# Patient Record
Sex: FEMALE | Race: WHITE | NOT HISPANIC OR LATINO | Employment: UNEMPLOYED | URBAN - METROPOLITAN AREA
[De-identification: names, ages, dates, MRNs, and addresses within clinical notes are randomized per-mention and may not be internally consistent; named-entity substitution may affect disease eponyms.]

---
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
## 2018-01-04 ENCOUNTER — TELEPHONE (OUTPATIENT)
Dept: ORTHOPEDICS | Facility: CLINIC | Age: 18
End: 2018-01-04
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
## 2018-01-10 ENCOUNTER — OFFICE VISIT (OUTPATIENT)
Dept: ORTHOPEDICS | Facility: CLINIC | Age: 18
End: 2018-01-10
Payer: MEDICAID

## 2018-01-10 VITALS — BODY MASS INDEX: 28.25 KG/M2 | WEIGHT: 180 LBS | HEIGHT: 67 IN

## 2018-01-10 DIAGNOSIS — M54.50 ACUTE LEFT-SIDED LOW BACK PAIN WITHOUT SCIATICA: Primary | ICD-10-CM

## 2018-01-10 PROCEDURE — 99213 OFFICE O/P EST LOW 20 MIN: CPT | Mod: PBBFAC,PN | Performed by: ORTHOPAEDIC SURGERY

## 2018-01-10 PROCEDURE — 99999 PR PBB SHADOW E&M-EST. PATIENT-LVL III: CPT | Mod: PBBFAC,,, | Performed by: ORTHOPAEDIC SURGERY

## 2018-01-10 PROCEDURE — 99214 OFFICE O/P EST MOD 30 MIN: CPT | Mod: S$PBB,,, | Performed by: ORTHOPAEDIC SURGERY

## 2018-01-10 RX ORDER — NAPROXEN 500 MG/1
500 TABLET ORAL 2 TIMES DAILY WITH MEALS
Qty: 60 TABLET | Refills: 2 | Status: SHIPPED | OUTPATIENT
Start: 2018-01-10 | End: 2018-10-25
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
## 2018-01-19 ENCOUNTER — TELEPHONE (OUTPATIENT)
Dept: ORTHOPEDICS | Facility: CLINIC | Age: 18
End: 2018-01-19

## 2018-01-19 NOTE — TELEPHONE ENCOUNTER
Spoke with patient after verification of name and  provided patient with an appointment date and time that worked for her schedule 18 @ 9AM. Patient voiced understanding.

## 2018-02-02 ENCOUNTER — PATIENT MESSAGE (OUTPATIENT)
Dept: ORTHOPEDICS | Facility: CLINIC | Age: 18
End: 2018-02-02

## 2018-02-02 DIAGNOSIS — M54.42 ACUTE LEFT-SIDED LOW BACK PAIN WITH LEFT-SIDED SCIATICA: Primary | ICD-10-CM

## 2018-04-24 DIAGNOSIS — G89.29 CHRONIC PAIN: ICD-10-CM

## 2018-04-24 DIAGNOSIS — M54.50 LOW BACK PAIN: Primary | ICD-10-CM

## 2018-05-14 ENCOUNTER — CLINICAL SUPPORT (OUTPATIENT)
Dept: REHABILITATION | Facility: HOSPITAL | Age: 18
End: 2018-05-14
Payer: MEDICAID

## 2018-05-14 DIAGNOSIS — M54.5 BILATERAL LOW BACK PAIN, UNSPECIFIED CHRONICITY, WITH SCIATICA PRESENCE UNSPECIFIED: ICD-10-CM

## 2018-05-14 PROBLEM — M54.50 BILATERAL LOW BACK PAIN: Status: ACTIVE | Noted: 2018-05-14

## 2018-05-14 PROCEDURE — 97161 PT EVAL LOW COMPLEX 20 MIN: CPT | Mod: PO | Performed by: PHYSICAL THERAPIST
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
## 2018-05-14 NOTE — PATIENT INSTRUCTIONS
Pelvic Tilt        Flatten back by tightening stomach muscles and buttocks.  Repeat 10 times per set. Do 1-2 sets per session. Do 1-2 sessions per day.     [email or web address]     Copyright © [employer]. All rights reserved.        Copyright © [employer]. All rights reserved.   Knee-to-Chest Stretch: Unilateral        With hand behind right knee, pull knee in to chest until a comfortable stretch is felt in lower back and buttocks. Keep back relaxed. Hold 30 seconds.  Repeat 3 times per set. Do 1 sets per session. Do 1-2 sessions per day.     [email or web address]     Copyright © [employer]. All rights reserved.     Bridging        Slowly raise buttocks from floor, keeping stomach tight.  Repeat 10 times per set. Do 2 sets per session. Do 1-2 sessions per day.     [email or web address]     Copyright © [employer]. All rights reserved.     Clam Shell 45 Degrees        Lying with hips and knees bent 45°, one pillow between knees and ankles. Lift knee. Be sure pelvis does not roll backward. Do not arch back.  Do 10 times, each leg, 1-2 times per day.     [email or web address]     Copyright © [employer]. All rights reserved.   Heel Slide: 4-10 Inches - Sagittal Plane Stability        Slide heel 4 inches down. Be sure pelvis does not tip forward or backward. Do 10 times. Restabilize pelvis. Repeat with other leg.  Do 1 sets, 1-2 times per day.     [email or web address]     Copyright © [employer]. All rights reserved.

## 2018-06-12 ENCOUNTER — CLINICAL SUPPORT (OUTPATIENT)
Dept: REHABILITATION | Facility: HOSPITAL | Age: 18
End: 2018-06-12
Payer: MEDICAID

## 2018-06-12 DIAGNOSIS — M54.5 BILATERAL LOW BACK PAIN, UNSPECIFIED CHRONICITY, WITH SCIATICA PRESENCE UNSPECIFIED: ICD-10-CM

## 2018-06-12 PROCEDURE — 97110 THERAPEUTIC EXERCISES: CPT | Mod: PO | Performed by: PHYSICAL THERAPIST

## 2018-06-12 NOTE — PROGRESS NOTES
"                            Physical Therapy Daily Treatment Note     Name: Maalchi Weston  Clinic Number: [id number]    Therapy Diagnosis:   Encounter Diagnosis   Name Primary?    Bilateral low back pain, unspecified chronicity, with sciatica presence unspecified      Physician: Floyd, Provider  Visit Date: 6/12/2018    Physician Orders: PT Eval and Treat  Medical Diagnosis: low back pain, chronic  Evaluation Date: 5/14/2018  Authorization Period Expiration: 4/29/2018  Plan of Care Certification Period: 7/9/18  Visit #/ Visits authorized: 2 / 12    Time In: 1500  Time Out: 1600  Total Billable Time: 55 minutes    Precautions: Standard and history of hypotension    Subjective     Pt reports: she was not compliant with home exercise program given last session. Patient reported reason why due to recent events involving depression. Patient reported seeking medical professional as well.    Pain: 5/10  Location: low back    Objective   Vitals:  /89, HR 67 bpm    Guillermo received therapeutic exercises to develop strength, endurance, ROM, flexibility, posture and core stabilization for 55 minutes including:    -SKTC 3/30" each  -TA 3/10 with proper breathing  -S/L CLAM 3/10 with GTB  -prone x 2 minutes to prone: alternating arm/leg 2/10 slowly    Shuttle: DL 3b 3/10 with hip band (green), SL 2b 2/10 alternating      Home Exercises Provided and Patient Education Provided     Education provided:   - progress towards goals   - role of therapy     Written Home Exercises Provided: Patient educated to continue with previously issued HEP.  Exercises were reviewed and Guillermo was able to demonstrate them prior to the end of the session.   Pt received a written copy of exercises to perform at home.     Guillermo demonstrated good  understanding of the education provided.     Assessment     Good tolerance with TE. Tactile input for TA. No increase in s/s.  Fair neuromuscular control.    Guillermo is progressing well towards her goals. "   Pt prognosis is Fair.     Pt will continue to benefit from skilled outpatient physical therapy to address the deficits listed in the problem list box on initial evaluation, provide pt/family education and to maximize pt's level of independence in the home and community environment.     Pt's spiritual, cultural and educational needs considered and pt agreeable to plan of care and goals.    Anticipated barriers to physical therapy:     Goals:   Short Term GOALS:  In 4 weeks, pt. will:  1. Pt will present with increased lumbar extension AROM by 25% for ADL purposes.  2. Pt will present with increased hip MMT by one half grade for increased ease with functional ADLs.  3. Pt will report decreased pain to </= 3/10  4. Pt will demonstrate TA activation while performing dynamic movement for stability purposes.  5. Increase hamstring length 5-8 degrees for mobility purposes.     Long Term GOALS:  In 8 weeks, pt. will:  - be independent with HEP and SX management   -demonstrate hip MMT 4 or > for ADL purposes.  -demonstrate lumbar extension with uniform curvature for postural balance purposes.  -negotiate stairs independently with no painful limitations.  -demonstrate corrective seated posture for school/work related tasks.  Plan     Continue with SHAMA Abdi, PT

## 2018-06-19 ENCOUNTER — CLINICAL SUPPORT (OUTPATIENT)
Dept: REHABILITATION | Facility: HOSPITAL | Age: 18
End: 2018-06-19
Payer: MEDICAID

## 2018-06-19 DIAGNOSIS — M54.5 BILATERAL LOW BACK PAIN, UNSPECIFIED CHRONICITY, WITH SCIATICA PRESENCE UNSPECIFIED: ICD-10-CM

## 2018-06-19 PROCEDURE — 97110 THERAPEUTIC EXERCISES: CPT | Mod: PO | Performed by: PHYSICAL THERAPIST

## 2018-06-19 NOTE — PROGRESS NOTES
"                            Physical Therapy Daily Treatment Note     Name: Malachi Weston  Clinic Number: [id number]    Therapy Diagnosis:   Encounter Diagnosis   Name Primary?    Bilateral low back pain, unspecified chronicity, with sciatica presence unspecified      Physician: Floyd, Provider  Visit Date: 6/19/2018    Physician Orders: PT Eval and Treat  Medical Diagnosis: low back pain, chronic  Evaluation Date: 5/14/2018  Authorization Period Expiration: 4/29/2018  Plan of Care Certification Period: 7/9/18  Visit #/ Visits authorized: 3 / 12    Time In: 1500  Time Out: 1600  Total Billable Time: 55 minutes    Precautions: Standard and history of hypotension    Subjective     Pt reports: she was starting the HEP and feels better with the legs up vs lying down. Localized low back pain. No radicular pain. No numbness/tingling noted.    Pain: 2/10  Location: low back    Objective   Vitals:  /82, HR 69 bpm    Guillermo received therapeutic exercises to develop strength, endurance, ROM, flexibility, posture and core stabilization for 55 minutes including:  -recumbent bike x 10 minutes, low intensity on slant board  -standing gastroc stretch 3/30"   -SKTC 3/30" each  -TA 3/10 with proper breathing  -S/L CLAM 3/10 with GTB  -prone x 2 minutes to prone: alternating arm/leg 2/10 slowly (previous)    -supine: TA (modified dead-bug) 2/10    Shuttle: DL 3b 3/10 with hip band (green), SL 2b 2/10 alternating    Lateral step downs: 2/10 each    Home Exercises Provided and Patient Education Provided     Education provided:   - progress towards goals   - role of therapy     Written Home Exercises Provided: Patient educated to continue with previously issued HEP.  Exercises were reviewed and Guillermo was able to demonstrate them prior to the end of the session.   Pt received a written copy of exercises to perform at home.     Giullermo demonstrated good  understanding of the education provided.     Assessment   Patient received 50% " cueing and tactile input on TA activation. No increase in s/s.  Good tolerance with standing TE/lateral step downs.  Fair neuromuscular control.    Guillermo is progressing well towards her goals.   Pt prognosis is Fair.     Pt will continue to benefit from skilled outpatient physical therapy to address the deficits listed in the problem list box on initial evaluation, provide pt/family education and to maximize pt's level of independence in the home and community environment.     Pt's spiritual, cultural and educational needs considered and pt agreeable to plan of care and goals.    Anticipated barriers to physical therapy:     Goals:   Short Term GOALS:  In 4 weeks, pt. will:  1. Pt will present with increased lumbar extension AROM by 25% for ADL purposes.  2. Pt will present with increased hip MMT by one half grade for increased ease with functional ADLs.  3. Pt will report decreased pain to </= 3/10  4. Pt will demonstrate TA activation while performing dynamic movement for stability purposes.  5. Increase hamstring length 5-8 degrees for mobility purposes.     Long Term GOALS:  In 8 weeks, pt. will:  - be independent with HEP and SX management   -demonstrate hip MMT 4 or > for ADL purposes.  -demonstrate lumbar extension with uniform curvature for postural balance purposes.  -negotiate stairs independently with no painful limitations.  -demonstrate corrective seated posture for school/work related tasks.  Plan     Continue with SHAMA Abdi, PT

## 2018-06-26 ENCOUNTER — CLINICAL SUPPORT (OUTPATIENT)
Dept: REHABILITATION | Facility: HOSPITAL | Age: 18
End: 2018-06-26
Payer: MEDICAID

## 2018-06-26 DIAGNOSIS — M54.5 BILATERAL LOW BACK PAIN, UNSPECIFIED CHRONICITY, WITH SCIATICA PRESENCE UNSPECIFIED: ICD-10-CM

## 2018-06-26 PROCEDURE — 97110 THERAPEUTIC EXERCISES: CPT | Mod: PO | Performed by: PHYSICAL THERAPIST

## 2018-06-26 NOTE — PROGRESS NOTES
"                            Physical Therapy Daily Treatment Note     Name: Malachi Weston  Clinic Number: [id number]    Therapy Diagnosis:   Encounter Diagnosis   Name Primary?    Bilateral low back pain, unspecified chronicity, with sciatica presence unspecified      Physician: Floyd, Provider  Visit Date: 6/26/2018    Physician Orders: PT Eval and Treat  Medical Diagnosis: low back pain, chronic  Evaluation Date: 5/14/2018  Authorization Period Expiration: 4/29/2018  Plan of Care Certification Period: 7/9/18  Visit #/ Visits authorized: 4 / 12    Time In: 1500  Time Out: 1600  Total Billable Time: 55 minutes    Precautions: Standard and history of hypotension    Subjective     Pt reports: she has been having low back pain at the end of the day from prolong standing. No new s/s. No radicular pain.    Pain: 2/10  Location: low back    Objective   Vitals:  /80, HR 70 bpm    Guillermo received therapeutic exercises to develop strength, endurance, ROM, flexibility, posture and core stabilization for 55 minutes including:    -recumbent bike x 10 minutes, low intensity on slant board  -standing gastroc stretch 3/30"   -SKTC 3/30" each  -TA 3/10 with proper breathing  -S/L CLAM 3/10 with GTB  -prone x 2 minutes to prone: alternating arm/leg 2/10 slowly     -supine: TA (modified dead-bug) 2/10    Shuttle: DL 3b 3/10 with hip band (green), SL 2b 3/10 alternating    Lateral step downs: 2/10 each    Home Exercises Provided and Patient Education Provided     Education provided:   - progress towards goals   - role of therapy     Written Home Exercises Provided: Patient educated to continue with previously issued HEP.  Exercises were reviewed and Guillermo was able to demonstrate them prior to the end of the session.   Pt received a written copy of exercises to perform at home.     Guillermo demonstrated good  understanding of the education provided.     Assessment   Patient able to sustain supine pelvic tilt/TA while moving limbs. " No increase in s/s.  Good tolerance with standing TE/lateral step downs.  Fair neuromuscular control continues.    Guillermo is progressing well towards her goals.   Pt prognosis is Fair.     Pt will continue to benefit from skilled outpatient physical therapy to address the deficits listed in the problem list box on initial evaluation, provide pt/family education and to maximize pt's level of independence in the home and community environment.     Pt's spiritual, cultural and educational needs considered and pt agreeable to plan of care and goals.    Anticipated barriers to physical therapy:     Goals:   Short Term GOALS:  In 4 weeks, pt. will:  1. Pt will present with increased lumbar extension AROM by 25% for ADL purposes.  2. Pt will present with increased hip MMT by one half grade for increased ease with functional ADLs.  3. Pt will report decreased pain to </= 3/10  4. Pt will demonstrate TA activation while performing dynamic movement for stability purposes.  5. Increase hamstring length 5-8 degrees for mobility purposes.     Long Term GOALS:  In 8 weeks, pt. will:  - be independent with HEP and SX management   -demonstrate hip MMT 4 or > for ADL purposes.  -demonstrate lumbar extension with uniform curvature for postural balance purposes.  -negotiate stairs independently with no painful limitations.  -demonstrate corrective seated posture for school/work related tasks.  Plan     Continue with SHAMA Abdi, PT

## 2018-07-03 ENCOUNTER — CLINICAL SUPPORT (OUTPATIENT)
Dept: REHABILITATION | Facility: HOSPITAL | Age: 18
End: 2018-07-03
Payer: MEDICAID

## 2018-07-03 DIAGNOSIS — M54.5 BILATERAL LOW BACK PAIN, UNSPECIFIED CHRONICITY, WITH SCIATICA PRESENCE UNSPECIFIED: ICD-10-CM

## 2018-07-03 PROCEDURE — 97110 THERAPEUTIC EXERCISES: CPT | Mod: PO | Performed by: PHYSICAL THERAPIST

## 2018-07-03 NOTE — PROGRESS NOTES
"                            Physical Therapy Daily Treatment Note     Name: Malachi Weston  Clinic Number: [id number]    Therapy Diagnosis:   Encounter Diagnosis   Name Primary?    Bilateral low back pain, unspecified chronicity, with sciatica presence unspecified      Physician: Floyd, Provider  Visit Date: 7/3/2018    Physician Orders: PT Eval and Treat  Medical Diagnosis: low back pain, chronic  Evaluation Date: 5/14/2018  Authorization Period Expiration: 4/29/2018  Plan of Care Certification Period: 7/9/18  Visit #/ Visits authorized: 5/ 12    Time In: 1450  Time Out: 1550  Total Billable Time: 55 minutes    Precautions: Standard and history of hypotension.    Subjective     Pt reports: she has been performing HEP once a day. Started a new job (full time) with prolong standing noted.  Patient noted with prolong standing with 2 full time jobs. Localized. No radicular. Improved mobility    Pain: 2/10  Location: low back    Objective   Vitals:  /80, HR 65 bpm    Guillermo received therapeutic exercises to develop strength, endurance, ROM, flexibility, posture and core stabilization for 55 minutes including:    -recumbent bike x 5 minutes, low intensity on slant board  -standing gastroc stretch 3/30"   -SLR 2/10 each  -SKTC 3/30" each  -TA 3/10 with proper breathing  -S/L CLAM 3/10 with GTB each    -quadruped: x 10 with neutral pelvis (alternating legs)    -supine: TA (modified dead-bug) 2/10  -supine: TA/ball press 2/10    Shuttle: DL 3b 2/10 with hip band (green), SL 2b 3/10 alternating    Lateral step downs: 3/10 each    Home Exercises Provided and Patient Education Provided     Education provided:   - progress towards goals   - role of therapy     Written Home Exercises Provided: Patient educated to continue with previously issued HEP.  Exercises were reviewed and Guillermo was able to demonstrate them prior to the end of the session.   Pt received a written copy of exercises to perform at home.     Guillermo " demonstrated good  understanding of the education provided.     Assessment   Tolerated TE with improvement with TA.  No increase in s/s.    Guillermo is progressing well towards her goals.   Pt prognosis is Fair.     Pt will continue to benefit from skilled outpatient physical therapy to address the deficits listed in the problem list box on initial evaluation, provide pt/family education and to maximize pt's level of independence in the home and community environment.     Pt's spiritual, cultural and educational needs considered and pt agreeable to plan of care and goals.    Anticipated barriers to physical therapy:     Goals:   Short Term GOALS:  In 4 weeks, pt. will:  1. Pt will present with increased lumbar extension AROM by 25% for ADL purposes.  2. Pt will present with increased hip MMT by one half grade for increased ease with functional ADLs.  3. Pt will report decreased pain to </= 3/10  4. Pt will demonstrate TA activation while performing dynamic movement for stability purposes.  5. Increase hamstring length 5-8 degrees for mobility purposes.     Long Term GOALS:  In 8 weeks, pt. will:  - be independent with HEP and SX management   -demonstrate hip MMT 4 or > for ADL purposes.  -demonstrate lumbar extension with uniform curvature for postural balance purposes.  -negotiate stairs independently with no painful limitations.  -demonstrate corrective seated posture for school/work related tasks.  Plan     Continue with SHAMA Abdi, PT

## 2018-07-10 ENCOUNTER — CLINICAL SUPPORT (OUTPATIENT)
Dept: REHABILITATION | Facility: HOSPITAL | Age: 18
End: 2018-07-10
Payer: MEDICAID

## 2018-07-10 DIAGNOSIS — M54.5 BILATERAL LOW BACK PAIN, UNSPECIFIED CHRONICITY, WITH SCIATICA PRESENCE UNSPECIFIED: ICD-10-CM

## 2018-07-10 PROCEDURE — 97110 THERAPEUTIC EXERCISES: CPT | Mod: PO | Performed by: PHYSICAL THERAPIST

## 2018-07-10 NOTE — PROGRESS NOTES
"                            Physical Therapy Daily Treatment Note     Name: Malachi Weston  Clinic Number: [id number]    Therapy Diagnosis:   Encounter Diagnosis   Name Primary?    Bilateral low back pain, unspecified chronicity, with sciatica presence unspecified      Physician: Floyd, Provider  Visit Date: 7/10/2018    Physician Orders: PT Eval and Treat  Medical Diagnosis: low back pain, chronic  Evaluation Date: 5/14/2018  Authorization Period Expiration: 4/29/2018  Plan of Care Certification Period: 7/9/18  Visit #/ Visits authorized: 6/ 12    Time In: 1445  Time Out: 1545  Total Billable Time: 55 minutes    Precautions: Standard and history of hypotension.    Subjective     Pt reports: she will be getting MRI next week and will bring results to the department. No new s/s. Has been working double shifts involving prolong standing/walking.    Pain: 2/10  Location: low back    Objective   Vitals:  /78, HR 70 bpm    Guillermo received therapeutic exercises to develop strength, endurance, ROM, flexibility, posture and core stabilization for 55 minutes including:    -recumbent bike x 5 minutes, low intensity on slant board  -standing gastroc stretch 3/30"   -SLR 2/10 each  -SKTC 3/30" each  -TA 3/10 with proper breathing  -S/L CLAM 3/10 with GTB each   -quadruped: x 10 with neutral pelvis (alternating legs)    HS 90/90:  R= 30 degrees  L= 30 degrees    -supine: TA (modified dead-bug) 2/10  -supine: TA/ball press 2/10.    Shuttle: DL 3b 2/10 with hip band (green), SL 2b 3/10 alternating    Lateral step downs: 3/10 each       Thoracic/Lumbar AROM:     % limitation Pain/Dysfunction/movement   Flexion      25% Non-uniform curvature, localized low back pain. No radicular pain.   Extension   25% No pain. Non-uniform curvature. Directional preference. Feels better.   Right rotation   < 45 degrees rotation No pain   Left rotation   < 45 degrees rotation No pain           Home Exercises Provided and Patient Education " Provided   Education provided:   - progress towards goals   - role of therapy     Written Home Exercises Provided: Patient educated to continue with previously issued HEP.  Exercises were reviewed and Guillermo was able to demonstrate them prior to the end of the session.   Pt received a written copy of exercises to perform at home.     CMS Impairment/Limitation/Restriction for FOTO Lumbar Spine Survey  Status Limitation G-Code CMS Severity Modifier  Intake 34% 66%  Predicted 52% 48% Goal Status+ CK - At least 40 percent but less than 60 percent  7/10/2018 47% 53% Current Status CK - At least 40 percent but less than 60 percent    Guillermo demonstrated good  understanding of the education provided.     Assessment   Tolerated TE with improvement with TA activation in supine position  No increase in s/s.  Decreased activity tolerance in standing/walking noted. Postural imbalance in seated position.    Guillermo is progressing well towards her goals.   Pt prognosis is Fair.     Pt will continue to benefit from skilled outpatient physical therapy to address the deficits listed in the problem list box on initial evaluation, provide pt/family education and to maximize pt's level of independence in the home and community environment.     Pt's spiritual, cultural and educational needs considered and pt agreeable to plan of care and goals.    Anticipated barriers to physical therapy:     Goals:   Short Term GOALS:  In 4 weeks, pt. will:  1. Pt will present with increased lumbar extension AROM by 25% for ADL purposes. Met  2. Pt will present with increased hip MMT by one half grade for increased ease with functional ADLs. Met  3. Pt will report decreased pain to </= 3/10. Ongoing  4. Pt will demonstrate TA activation while performing dynamic movement for stability purposes. Met  5. Increase hamstring length 5-8 degrees for mobility purposes. Met     Long Term GOALS:  In 8 weeks, pt. will:  - be independent with HEP and SX management    -demonstrate hip MMT 4 or > for ADL purposes.  -demonstrate lumbar extension with uniform curvature for postural balance purposes.  -negotiate stairs independently with no painful limitations.  -demonstrate corrective seated posture for school/work related tasks.  Plan     Certification Period: 07/10/2018 to 08/21/2018  Recommended Treatment Plan: 1-2 times per week for 6 weeks: Manual Therapy, Moist Heat/ Ice, Neuromuscular Re-ed, Patient Education, Therapeutic Activites and Therapeutic Exercise  Other Recommendations: Thank you for consult.    I CERTIFY THE NEED FOR THESE SERVICES FURNISHED UNDER THIS PLAN OF TREATMENT AND WHILE UNDER MY CARE    Physician's comments: ________________________________________________________________________________________________________________________________________________      Physician's Name: ___________________________________      Tavo Abdi PT

## 2018-08-02 ENCOUNTER — TELEPHONE (OUTPATIENT)
Dept: ORTHOPEDICS | Facility: CLINIC | Age: 18
End: 2018-08-02

## 2018-08-02 DIAGNOSIS — M54.50 LUMBAR SPINE PAIN: Primary | ICD-10-CM

## 2018-08-08 ENCOUNTER — PATIENT MESSAGE (OUTPATIENT)
Dept: ORTHOPEDICS | Facility: CLINIC | Age: 18
End: 2018-08-08

## 2018-08-10 ENCOUNTER — PATIENT MESSAGE (OUTPATIENT)
Dept: ORTHOPEDICS | Facility: CLINIC | Age: 18
End: 2018-08-10

## 2018-08-10 ENCOUNTER — INITIAL CONSULT (OUTPATIENT)
Dept: ORTHOPEDICS | Facility: CLINIC | Age: 18
End: 2018-08-10
Payer: MEDICAID

## 2018-08-10 ENCOUNTER — HOSPITAL ENCOUNTER (OUTPATIENT)
Dept: RADIOLOGY | Facility: HOSPITAL | Age: 18
Discharge: HOME | End: 2018-08-10
Attending: ORTHOPAEDIC SURGERY
Payer: MEDICAID

## 2018-08-10 VITALS
DIASTOLIC BLOOD PRESSURE: 76 MMHG | SYSTOLIC BLOOD PRESSURE: 109 MMHG | HEIGHT: 68 IN | WEIGHT: 208.25 LBS | BODY MASS INDEX: 31.56 KG/M2 | HEART RATE: 86 BPM

## 2018-08-10 DIAGNOSIS — M43.10 SPONDYLOLISTHESIS, ACQUIRED: Primary | ICD-10-CM

## 2018-08-10 DIAGNOSIS — M43.10 SPONDYLOLISTHESIS, ACQUIRED: ICD-10-CM

## 2018-08-10 DIAGNOSIS — M54.50 LUMBAR SPINE PAIN: ICD-10-CM

## 2018-08-10 PROCEDURE — 72100 X-RAY EXAM L-S SPINE 2/3 VWS: CPT | Mod: 26,,, | Performed by: RADIOLOGY

## 2018-08-10 PROCEDURE — 72120 X-RAY BEND ONLY L-S SPINE: CPT | Mod: TC,FY,PO

## 2018-08-10 PROCEDURE — 99999 PR PBB SHADOW E&M-EST. PATIENT-LVL IV: CPT | Mod: PBBFAC,,, | Performed by: ORTHOPAEDIC SURGERY

## 2018-08-10 PROCEDURE — 99214 OFFICE O/P EST MOD 30 MIN: CPT | Mod: PBBFAC,25 | Performed by: ORTHOPAEDIC SURGERY

## 2018-08-10 PROCEDURE — 72120 X-RAY BEND ONLY L-S SPINE: CPT | Mod: 26,,, | Performed by: RADIOLOGY

## 2018-08-10 PROCEDURE — 99214 OFFICE O/P EST MOD 30 MIN: CPT | Mod: S$PBB,,, | Performed by: ORTHOPAEDIC SURGERY

## 2018-08-10 RX ORDER — BUSPIRONE HYDROCHLORIDE 10 MG/1
20 TABLET ORAL 3 TIMES DAILY
COMMUNITY
End: 2018-10-25

## 2018-08-10 RX ORDER — CLOZAPINE 25 MG/1
TABLET ORAL
COMMUNITY
End: 2018-10-25
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
## 2018-08-16 ENCOUNTER — DOCUMENTATION ONLY (OUTPATIENT)
Dept: SPINE | Facility: CLINIC | Age: 18
End: 2018-08-16

## 2018-08-16 NOTE — PROGRESS NOTES
Report of outside MRI of lumbar spine dated 7/17/18 is personally reviewed and shows large left paracentral disc extrusion with mild inferior migration at the L5-S1 level.     Report to be scanned into chart.

## 2018-08-24 ENCOUNTER — HOSPITAL ENCOUNTER (OUTPATIENT)
Dept: RADIOLOGY | Facility: HOSPITAL | Age: 18
Discharge: HOME | End: 2018-08-24
Attending: ORTHOPAEDIC SURGERY
Payer: MEDICAID

## 2018-08-24 PROCEDURE — 72131 CT LUMBAR SPINE W/O DYE: CPT | Mod: 26,,, | Performed by: RADIOLOGY

## 2018-08-24 PROCEDURE — 72131 CT LUMBAR SPINE W/O DYE: CPT | Mod: TC,PO

## 2018-08-31 ENCOUNTER — OFFICE VISIT (OUTPATIENT)
Dept: ORTHOPEDICS | Facility: CLINIC | Age: 18
End: 2018-08-31
Payer: MEDICAID

## 2018-08-31 VITALS — HEIGHT: 68 IN | WEIGHT: 208.13 LBS | BODY MASS INDEX: 31.54 KG/M2

## 2018-08-31 DIAGNOSIS — M51.36 DDD (DEGENERATIVE DISC DISEASE), LUMBAR: Primary | ICD-10-CM

## 2018-08-31 PROCEDURE — 99212 OFFICE O/P EST SF 10 MIN: CPT | Mod: PBBFAC | Performed by: ORTHOPAEDIC SURGERY

## 2018-08-31 PROCEDURE — 99999 PR PBB SHADOW E&M-EST. PATIENT-LVL II: CPT | Mod: PBBFAC,,, | Performed by: ORTHOPAEDIC SURGERY

## 2018-08-31 PROCEDURE — 99213 OFFICE O/P EST LOW 20 MIN: CPT | Mod: S$PBB,,, | Performed by: ORTHOPAEDIC SURGERY

## 2018-08-31 RX ORDER — ONDANSETRON 4 MG/1
TABLET, ORALLY DISINTEGRATING ORAL
Refills: 0 | Status: ON HOLD | COMMUNITY
Start: 2018-08-04 | End: 2018-10-04 | Stop reason: HOSPADM

## 2018-08-31 RX ORDER — CLONAZEPAM 1 MG/1
1 TABLET ORAL 2 TIMES DAILY
Refills: 1 | COMMUNITY
Start: 2018-07-10 | End: 2018-10-25

## 2018-08-31 RX ORDER — HYDROXYZINE PAMOATE 25 MG/1
CAPSULE ORAL
COMMUNITY
Start: 2018-08-23

## 2018-09-04 NOTE — PROGRESS NOTES
The patient returns for CT results. At our last visit I was suspicious for a spondylolysis given her long history of low back and buttock pain.    Her CT does not demonstrate any evidence of spondylolysis but confirms a Left L5/S1 disc herniation.    Today we discussed options, I think that it is prudent at this point to proceed with a lumbar GRECIA

## 2018-09-11 DIAGNOSIS — M54.16 LUMBAR RADICULOPATHY: Primary | ICD-10-CM

## 2018-09-12 PROBLEM — M54.16 LUMBAR RADICULOPATHY: Status: ACTIVE | Noted: 2018-09-12

## 2018-09-12 RX ORDER — SODIUM CHLORIDE 9 MG/ML
INJECTION, SOLUTION INTRAVENOUS CONTINUOUS
Status: CANCELLED | OUTPATIENT
Start: 2018-09-12

## 2018-09-12 RX ORDER — MUPIROCIN 20 MG/G
OINTMENT TOPICAL
Status: CANCELLED | OUTPATIENT
Start: 2018-09-12

## 2018-09-21 ENCOUNTER — OFFICE VISIT (OUTPATIENT)
Dept: ORTHOPEDICS | Facility: CLINIC | Age: 18
End: 2018-09-21
Payer: MEDICAID

## 2018-09-21 DIAGNOSIS — M54.16 LUMBAR RADICULOPATHY: Primary | ICD-10-CM

## 2018-09-21 PROCEDURE — 99999 PR PBB SHADOW E&M-EST. PATIENT-LVL II: CPT | Mod: PBBFAC,,, | Performed by: ORTHOPAEDIC SURGERY

## 2018-09-21 PROCEDURE — 99212 OFFICE O/P EST SF 10 MIN: CPT | Mod: PBBFAC | Performed by: ORTHOPAEDIC SURGERY

## 2018-09-21 PROCEDURE — 99213 OFFICE O/P EST LOW 20 MIN: CPT | Mod: S$PBB,,, | Performed by: ORTHOPAEDIC SURGERY

## 2018-09-21 NOTE — PROGRESS NOTES
The patient returns for CT results. At our last visit I was suspicious for a spondylolysis given her long history of low back and buttock pain.    Her CT does not demonstrate any evidence of spondylolysis but confirms a Left L5/S1 disc herniation.    I had a sit down discussion with the patient and her boyfriend regarding a left L5/S1 disectomy. We specifically discussed the risks, benefits, and alternatives to surgery. We discussed the surgical procedure including the skin incision, discectomy and nerve decompression: they understand the risks include but are not limited to bleeding, infection, damage to arteries, veins and nerves, re-herniation, death, paralysis, blindness, spinal fluid leak, continued or worsening pain, the possible need for more surgery in the future as well as the possibility other unforseen and unknown complications. We talked about expected hospital stay and recovery period. All questions were answered; they understand and wish to proceed.     Today we discussed options, secondary to insurance issues will proceed with discectomy for definitive management of disc herniation. R/B/A discussed in detail with patient.    I spent 15 minutes with the patient of which greater than 1/2 the time was devoted to counciling the patient regarding treatment options.

## 2018-09-26 ENCOUNTER — PATIENT MESSAGE (OUTPATIENT)
Dept: SURGERY | Facility: HOSPITAL | Age: 18
End: 2018-09-26

## 2018-10-04 ENCOUNTER — ANESTHESIA (OUTPATIENT)
Dept: SURGERY | Facility: HOSPITAL | Age: 18
End: 2018-10-04
Payer: MEDICAID

## 2018-10-04 ENCOUNTER — HOSPITAL ENCOUNTER (OUTPATIENT)
Facility: HOSPITAL | Age: 18
Discharge: HOME | End: 2018-10-05
Attending: ORTHOPAEDIC SURGERY | Admitting: ORTHOPAEDIC SURGERY
Payer: MEDICAID

## 2018-10-04 ENCOUNTER — ANESTHESIA EVENT (OUTPATIENT)
Dept: SURGERY | Facility: HOSPITAL | Age: 18
End: 2018-10-04
Payer: MEDICAID

## 2018-10-04 DIAGNOSIS — M54.5 BILATERAL LOW BACK PAIN, UNSPECIFIED CHRONICITY, WITH SCIATICA PRESENCE UNSPECIFIED: Primary | ICD-10-CM

## 2018-10-04 DIAGNOSIS — M54.16 LUMBAR RADICULOPATHY: ICD-10-CM

## 2018-10-04 LAB
ABO + RH BLD: NORMAL
B-HCG UR QL: NEGATIVE
BLD GP AB SCN CELLS X3 SERPL QL: NORMAL
CTP QC/QA: YES

## 2018-10-04 PROCEDURE — S0028 INJECTION, FAMOTIDINE, 20 MG: HCPCS | Performed by: NURSE ANESTHETIST, CERTIFIED REGISTERED

## 2018-10-04 PROCEDURE — 71000033 HC RECOVERY, INTIAL HOUR: Performed by: ORTHOPAEDIC SURGERY

## 2018-10-04 PROCEDURE — 25000003 PHARM REV CODE 250: Performed by: STUDENT IN AN ORGANIZED HEALTH CARE EDUCATION/TRAINING PROGRAM

## 2018-10-04 PROCEDURE — 71000015 HC POSTOP RECOV 1ST HR: Performed by: ORTHOPAEDIC SURGERY

## 2018-10-04 PROCEDURE — G0378 HOSPITAL OBSERVATION PER HR: HCPCS

## 2018-10-04 PROCEDURE — 63600175 PHARM REV CODE 636 W HCPCS: Performed by: STUDENT IN AN ORGANIZED HEALTH CARE EDUCATION/TRAINING PROGRAM

## 2018-10-04 PROCEDURE — 71000016 HC POSTOP RECOV ADDL HR: Performed by: ORTHOPAEDIC SURGERY

## 2018-10-04 PROCEDURE — 81025 URINE PREGNANCY TEST: CPT | Performed by: ORTHOPAEDIC SURGERY

## 2018-10-04 PROCEDURE — 86850 RBC ANTIBODY SCREEN: CPT

## 2018-10-04 PROCEDURE — 00670 ANES XTNSV SP&SPI CORD PX: CPT | Performed by: ORTHOPAEDIC SURGERY

## 2018-10-04 PROCEDURE — 63030 LAMOT DCMPRN NRV RT 1 LMBR: CPT | Mod: LT,,, | Performed by: ORTHOPAEDIC SURGERY

## 2018-10-04 PROCEDURE — 71000039 HC RECOVERY, EACH ADD'L HOUR: Performed by: ORTHOPAEDIC SURGERY

## 2018-10-04 PROCEDURE — 63600175 PHARM REV CODE 636 W HCPCS: Performed by: ANESTHESIOLOGY

## 2018-10-04 PROCEDURE — 37000008 HC ANESTHESIA 1ST 15 MINUTES: Performed by: ORTHOPAEDIC SURGERY

## 2018-10-04 PROCEDURE — D9220A PRA ANESTHESIA: Mod: ANES,,, | Performed by: ANESTHESIOLOGY

## 2018-10-04 PROCEDURE — 63600175 PHARM REV CODE 636 W HCPCS: Performed by: NURSE ANESTHETIST, CERTIFIED REGISTERED

## 2018-10-04 PROCEDURE — 86920 COMPATIBILITY TEST SPIN: CPT

## 2018-10-04 PROCEDURE — 25000003 PHARM REV CODE 250: Performed by: ORTHOPAEDIC SURGERY

## 2018-10-04 PROCEDURE — D9220A PRA ANESTHESIA: Mod: CRNA,,, | Performed by: NURSE ANESTHETIST, CERTIFIED REGISTERED

## 2018-10-04 PROCEDURE — 37000009 HC ANESTHESIA EA ADD 15 MINS: Performed by: ORTHOPAEDIC SURGERY

## 2018-10-04 PROCEDURE — C9290 INJ, BUPIVACAINE LIPOSOME: HCPCS | Performed by: ORTHOPAEDIC SURGERY

## 2018-10-04 PROCEDURE — 36000711: Performed by: ORTHOPAEDIC SURGERY

## 2018-10-04 PROCEDURE — 27201423 OPTIME MED/SURG SUP & DEVICES STERILE SUPPLY: Performed by: ORTHOPAEDIC SURGERY

## 2018-10-04 PROCEDURE — 63600175 PHARM REV CODE 636 W HCPCS: Performed by: ORTHOPAEDIC SURGERY

## 2018-10-04 PROCEDURE — 36000710: Performed by: ORTHOPAEDIC SURGERY

## 2018-10-04 PROCEDURE — 25000003 PHARM REV CODE 250: Performed by: NURSE ANESTHETIST, CERTIFIED REGISTERED

## 2018-10-04 PROCEDURE — 94761 N-INVAS EAR/PLS OXIMETRY MLT: CPT | Mod: 59

## 2018-10-04 PROCEDURE — 27000221 HC OXYGEN, UP TO 24 HOURS

## 2018-10-04 RX ORDER — NEOSTIGMINE METHYLSULFATE 1 MG/ML
INJECTION, SOLUTION INTRAVENOUS
Status: DISCONTINUED | OUTPATIENT
Start: 2018-10-04 | End: 2018-10-04

## 2018-10-04 RX ORDER — HYDROMORPHONE HYDROCHLORIDE 1 MG/ML
1 INJECTION, SOLUTION INTRAMUSCULAR; INTRAVENOUS; SUBCUTANEOUS EVERY 4 HOURS PRN
Status: DISCONTINUED | OUTPATIENT
Start: 2018-10-04 | End: 2018-10-05 | Stop reason: HOSPADM

## 2018-10-04 RX ORDER — ACETAMINOPHEN 325 MG/1
650 TABLET ORAL EVERY 4 HOURS PRN
Status: DISCONTINUED | OUTPATIENT
Start: 2018-10-04 | End: 2018-10-05 | Stop reason: HOSPADM

## 2018-10-04 RX ORDER — ACETAMINOPHEN 10 MG/ML
1000 INJECTION, SOLUTION INTRAVENOUS EVERY 8 HOURS
Status: DISCONTINUED | OUTPATIENT
Start: 2018-10-05 | End: 2018-10-05 | Stop reason: HOSPADM

## 2018-10-04 RX ORDER — SODIUM CHLORIDE 9 MG/ML
INJECTION, SOLUTION INTRAVENOUS CONTINUOUS
Status: DISCONTINUED | OUTPATIENT
Start: 2018-10-04 | End: 2018-10-05 | Stop reason: HOSPADM

## 2018-10-04 RX ORDER — LIDOCAINE HYDROCHLORIDE AND EPINEPHRINE 10; 10 MG/ML; UG/ML
INJECTION, SOLUTION INFILTRATION; PERINEURAL
Status: DISCONTINUED | OUTPATIENT
Start: 2018-10-04 | End: 2018-10-04 | Stop reason: HOSPADM

## 2018-10-04 RX ORDER — ACETAMINOPHEN 10 MG/ML
INJECTION, SOLUTION INTRAVENOUS
Status: DISCONTINUED | OUTPATIENT
Start: 2018-10-04 | End: 2018-10-04

## 2018-10-04 RX ORDER — OXYCODONE HYDROCHLORIDE 10 MG/1
10 TABLET ORAL EVERY 4 HOURS PRN
Status: DISCONTINUED | OUTPATIENT
Start: 2018-10-04 | End: 2018-10-05 | Stop reason: HOSPADM

## 2018-10-04 RX ORDER — MIDAZOLAM HYDROCHLORIDE 1 MG/ML
INJECTION, SOLUTION INTRAMUSCULAR; INTRAVENOUS
Status: DISCONTINUED | OUTPATIENT
Start: 2018-10-04 | End: 2018-10-04

## 2018-10-04 RX ORDER — SODIUM CHLORIDE 0.9 % (FLUSH) 0.9 %
3 SYRINGE (ML) INJECTION
Status: DISCONTINUED | OUTPATIENT
Start: 2018-10-04 | End: 2018-10-05 | Stop reason: HOSPADM

## 2018-10-04 RX ORDER — METHOCARBAMOL 750 MG/1
750 TABLET, FILM COATED ORAL 3 TIMES DAILY
Status: DISCONTINUED | OUTPATIENT
Start: 2018-10-04 | End: 2018-10-05 | Stop reason: HOSPADM

## 2018-10-04 RX ORDER — FENTANYL CITRATE 50 UG/ML
INJECTION, SOLUTION INTRAMUSCULAR; INTRAVENOUS
Status: DISCONTINUED | OUTPATIENT
Start: 2018-10-04 | End: 2018-10-04

## 2018-10-04 RX ORDER — GLYCOPYRROLATE 0.2 MG/ML
INJECTION INTRAMUSCULAR; INTRAVENOUS
Status: DISCONTINUED | OUTPATIENT
Start: 2018-10-04 | End: 2018-10-04

## 2018-10-04 RX ORDER — LIDOCAINE HCL/PF 100 MG/5ML
SYRINGE (ML) INTRAVENOUS
Status: DISCONTINUED | OUTPATIENT
Start: 2018-10-04 | End: 2018-10-04

## 2018-10-04 RX ORDER — ONDANSETRON 2 MG/ML
4 INJECTION INTRAMUSCULAR; INTRAVENOUS EVERY 12 HOURS PRN
Status: DISCONTINUED | OUTPATIENT
Start: 2018-10-04 | End: 2018-10-05 | Stop reason: HOSPADM

## 2018-10-04 RX ORDER — FENTANYL CITRATE 50 UG/ML
50 INJECTION, SOLUTION INTRAMUSCULAR; INTRAVENOUS EVERY 5 MIN PRN
Status: DISCONTINUED | OUTPATIENT
Start: 2018-10-04 | End: 2018-10-04 | Stop reason: HOSPADM

## 2018-10-04 RX ORDER — DOCUSATE SODIUM 100 MG/1
100 CAPSULE, LIQUID FILLED ORAL 2 TIMES DAILY
Qty: 60 CAPSULE | Refills: 0 | Status: SHIPPED | OUTPATIENT
Start: 2018-10-04 | End: 2018-12-21 | Stop reason: ALTCHOICE

## 2018-10-04 RX ORDER — DIPHENHYDRAMINE HCL 25 MG
25 CAPSULE ORAL EVERY 6 HOURS PRN
Status: DISCONTINUED | OUTPATIENT
Start: 2018-10-04 | End: 2018-10-05 | Stop reason: HOSPADM

## 2018-10-04 RX ORDER — DEXAMETHASONE SODIUM PHOSPHATE 4 MG/ML
INJECTION, SOLUTION INTRA-ARTICULAR; INTRALESIONAL; INTRAMUSCULAR; INTRAVENOUS; SOFT TISSUE
Status: DISCONTINUED | OUTPATIENT
Start: 2018-10-04 | End: 2018-10-04

## 2018-10-04 RX ORDER — ACETAMINOPHEN 10 MG/ML
1000 INJECTION, SOLUTION INTRAVENOUS ONCE
Status: COMPLETED | OUTPATIENT
Start: 2018-10-04 | End: 2018-10-04

## 2018-10-04 RX ORDER — MEPERIDINE HYDROCHLORIDE 50 MG/ML
12.5 INJECTION INTRAMUSCULAR; INTRAVENOUS; SUBCUTANEOUS EVERY 10 MIN PRN
Status: DISCONTINUED | OUTPATIENT
Start: 2018-10-04 | End: 2018-10-04 | Stop reason: HOSPADM

## 2018-10-04 RX ORDER — ONDANSETRON 2 MG/ML
INJECTION INTRAMUSCULAR; INTRAVENOUS
Status: DISCONTINUED | OUTPATIENT
Start: 2018-10-04 | End: 2018-10-04

## 2018-10-04 RX ORDER — ONDANSETRON HYDROCHLORIDE 8 MG/1
8 TABLET, FILM COATED ORAL EVERY 12 HOURS PRN
Qty: 60 TABLET | Refills: 0 | Status: SHIPPED | OUTPATIENT
Start: 2018-10-04

## 2018-10-04 RX ORDER — MUPIROCIN 20 MG/G
OINTMENT TOPICAL
Status: DISCONTINUED | OUTPATIENT
Start: 2018-10-04 | End: 2018-10-04 | Stop reason: HOSPADM

## 2018-10-04 RX ORDER — SODIUM CHLORIDE 0.9 % (FLUSH) 0.9 %
5 SYRINGE (ML) INJECTION
Status: DISCONTINUED | OUTPATIENT
Start: 2018-10-04 | End: 2018-10-05 | Stop reason: HOSPADM

## 2018-10-04 RX ORDER — BACITRACIN 50000 [IU]/1
INJECTION, POWDER, FOR SOLUTION INTRAMUSCULAR
Status: DISCONTINUED | OUTPATIENT
Start: 2018-10-04 | End: 2018-10-04 | Stop reason: HOSPADM

## 2018-10-04 RX ORDER — OXYCODONE HYDROCHLORIDE 5 MG/1
5 TABLET ORAL EVERY 4 HOURS PRN
Status: DISCONTINUED | OUTPATIENT
Start: 2018-10-04 | End: 2018-10-05 | Stop reason: HOSPADM

## 2018-10-04 RX ORDER — BUPIVACAINE HYDROCHLORIDE AND EPINEPHRINE 5; 5 MG/ML; UG/ML
INJECTION, SOLUTION EPIDURAL; INTRACAUDAL; PERINEURAL
Status: DISCONTINUED | OUTPATIENT
Start: 2018-10-04 | End: 2018-10-04 | Stop reason: HOSPADM

## 2018-10-04 RX ORDER — KETAMINE HCL IN 0.9 % NACL 50 MG/5 ML
SYRINGE (ML) INTRAVENOUS
Status: DISCONTINUED | OUTPATIENT
Start: 2018-10-04 | End: 2018-10-04

## 2018-10-04 RX ORDER — FAMOTIDINE 20 MG/1
20 TABLET, FILM COATED ORAL 2 TIMES DAILY
Status: DISCONTINUED | OUTPATIENT
Start: 2018-10-04 | End: 2018-10-05 | Stop reason: HOSPADM

## 2018-10-04 RX ORDER — DIPHENHYDRAMINE HYDROCHLORIDE 50 MG/ML
INJECTION INTRAMUSCULAR; INTRAVENOUS
Status: DISCONTINUED | OUTPATIENT
Start: 2018-10-04 | End: 2018-10-04

## 2018-10-04 RX ORDER — PROPOFOL 10 MG/ML
VIAL (ML) INTRAVENOUS
Status: DISCONTINUED | OUTPATIENT
Start: 2018-10-04 | End: 2018-10-04

## 2018-10-04 RX ORDER — FAMOTIDINE 10 MG/ML
INJECTION INTRAVENOUS
Status: DISCONTINUED | OUTPATIENT
Start: 2018-10-04 | End: 2018-10-04

## 2018-10-04 RX ORDER — ROCURONIUM BROMIDE 10 MG/ML
INJECTION, SOLUTION INTRAVENOUS
Status: DISCONTINUED | OUTPATIENT
Start: 2018-10-04 | End: 2018-10-04

## 2018-10-04 RX ORDER — OXYCODONE AND ACETAMINOPHEN 5; 325 MG/1; MG/1
1 TABLET ORAL EVERY 4 HOURS PRN
Qty: 91 TABLET | Refills: 0 | Status: SHIPPED | OUTPATIENT
Start: 2018-10-04 | End: 2018-10-25

## 2018-10-04 RX ADMIN — PROPOFOL 150 MG: 10 INJECTION, EMULSION INTRAVENOUS at 03:10

## 2018-10-04 RX ADMIN — FAMOTIDINE 20 MG: 20 TABLET ORAL at 11:10

## 2018-10-04 RX ADMIN — DEXAMETHASONE SODIUM PHOSPHATE 8 MG: 4 INJECTION, SOLUTION INTRAMUSCULAR; INTRAVENOUS at 04:10

## 2018-10-04 RX ADMIN — ONDANSETRON 4 MG: 2 INJECTION INTRAMUSCULAR; INTRAVENOUS at 07:10

## 2018-10-04 RX ADMIN — ACETAMINOPHEN 1000 MG: 10 INJECTION, SOLUTION INTRAVENOUS at 04:10

## 2018-10-04 RX ADMIN — FENTANYL CITRATE 50 MCG: 50 INJECTION INTRAMUSCULAR; INTRAVENOUS at 06:10

## 2018-10-04 RX ADMIN — FENTANYL CITRATE 50 MCG: 50 INJECTION, SOLUTION INTRAMUSCULAR; INTRAVENOUS at 05:10

## 2018-10-04 RX ADMIN — ONDANSETRON 4 MG: 2 INJECTION INTRAMUSCULAR; INTRAVENOUS at 04:10

## 2018-10-04 RX ADMIN — NEOSTIGMINE METHYLSULFATE 5 MG: 1 INJECTION INTRAVENOUS at 04:10

## 2018-10-04 RX ADMIN — METHOCARBAMOL 750 MG: 750 TABLET ORAL at 11:10

## 2018-10-04 RX ADMIN — FENTANYL CITRATE 50 MCG: 50 INJECTION, SOLUTION INTRAMUSCULAR; INTRAVENOUS at 04:10

## 2018-10-04 RX ADMIN — Medication 30 MG: at 03:10

## 2018-10-04 RX ADMIN — ACETAMINOPHEN 1000 MG: 10 INJECTION, SOLUTION INTRAVENOUS at 11:10

## 2018-10-04 RX ADMIN — MIDAZOLAM HYDROCHLORIDE 2 MG: 1 INJECTION, SOLUTION INTRAMUSCULAR; INTRAVENOUS at 03:10

## 2018-10-04 RX ADMIN — Medication 20 MG: at 03:10

## 2018-10-04 RX ADMIN — OXYCODONE HYDROCHLORIDE 10 MG: 10 TABLET ORAL at 05:10

## 2018-10-04 RX ADMIN — FAMOTIDINE 20 MG: 10 INJECTION, SOLUTION INTRAVENOUS at 04:10

## 2018-10-04 RX ADMIN — GLYCOPYRROLATE 0.6 MG: 0.2 INJECTION, SOLUTION INTRAMUSCULAR; INTRAVENOUS at 04:10

## 2018-10-04 RX ADMIN — LIDOCAINE HYDROCHLORIDE 60 MG: 20 INJECTION, SOLUTION INTRAVENOUS at 03:10

## 2018-10-04 RX ADMIN — MUPIROCIN: 20 OINTMENT TOPICAL at 01:10

## 2018-10-04 RX ADMIN — SODIUM CHLORIDE 1000 ML: 0.9 INJECTION, SOLUTION INTRAVENOUS at 01:10

## 2018-10-04 RX ADMIN — FENTANYL CITRATE 50 MCG: 50 INJECTION INTRAMUSCULAR; INTRAVENOUS at 05:10

## 2018-10-04 RX ADMIN — FENTANYL CITRATE 50 MCG: 50 INJECTION, SOLUTION INTRAMUSCULAR; INTRAVENOUS at 03:10

## 2018-10-04 RX ADMIN — ROCURONIUM BROMIDE 25 MG: 10 INJECTION, SOLUTION INTRAVENOUS at 03:10

## 2018-10-04 RX ADMIN — OXYCODONE HYDROCHLORIDE 10 MG: 10 TABLET ORAL at 11:10

## 2018-10-04 RX ADMIN — DIPHENHYDRAMINE HYDROCHLORIDE 25 MG: 50 INJECTION, SOLUTION INTRAMUSCULAR; INTRAVENOUS at 04:10

## 2018-10-04 RX ADMIN — SODIUM CHLORIDE 250 ML: 0.9 INJECTION, SOLUTION INTRAVENOUS at 11:10

## 2018-10-04 RX ADMIN — ROCURONIUM BROMIDE 5 MG: 10 INJECTION, SOLUTION INTRAVENOUS at 03:10

## 2018-10-04 NOTE — PROGRESS NOTES
Ortho resident paged to clarify if patient must urinate before she is D/C home, no orders to urinate. Patient does not need to urinate per MD

## 2018-10-04 NOTE — BRIEF OP NOTE
DATE OF PROCEDURE: 10/4/2018.     SURGEON: Anjum Perez M.D.     FIRST ASSISTANT SURGEON: Vini Lindsay MD, PGY-5    POSTOPERATIVE DIAGNOSIS: Left L5/S1 Disc herniation.     PROCEDURES PERFORMED:  1. Left L5/S1 disectomy     FINDINGS: none     ESTIMATED BLOOD LOSS: 50 mL.      SPECIMENS: none

## 2018-10-04 NOTE — TRANSFER OF CARE
"Anesthesia Transfer of Care Note    Patient: Malachi Weston    Procedure(s) Performed: Procedure(s) (LRB):  LAMINECTOMY, SPINE, LUMBAR, WITH DISCECTOMY Left L5/S1 SNS: SSEP/EMG [place] + Pads **Homa Retractor?** (Left)    Patient location: PACU    Anesthesia Type: general    Transport from OR: Transported from OR on 6-10 L/min O2 by face mask with adequate spontaneous ventilation    Post pain: adequate analgesia    Post assessment: no apparent anesthetic complications and tolerated procedure well    Post vital signs: stable    Level of consciousness: lethargic    Nausea/Vomiting: no nausea/vomiting    Complications: none    Transfer of care protocol was followed      Last vitals:   Visit Vitals  /69 (BP Location: Left arm, Patient Position: Lying)   Pulse 60   Temp 36.8 °C (98.3 °F) (Oral)   Resp 18   Ht 5' 8" (1.727 m)   Wt 94 kg (207 lb 3.7 oz)   LMP 10/01/2018   SpO2 100%   Breastfeeding? No   BMI 31.51 kg/m²     "

## 2018-10-04 NOTE — DISCHARGE INSTRUCTIONS
DR. TRAE CULVER    POSTOPERATIVE LUMBAR DISCECTOMY INSTRUCTIONS    Antibiotics: You do not need additional antibiotics at home.    Wound Care: You may remove your dressing and shower 5 days after surgery. Until then please keep your wound clean and dry. Sponge baths are acceptable. Do not go in a pool or hot tub until seen in clinic. Please leave the small steri-strips covering your wound in place until they fall off naturally (2 weeks). You may notice clear suture ends hanging from the sides of your incense after the steri-strips are removed, it is ok to clip these with scissors.    Brace: You do not need a brace.    Pain: You will be given a prescription for pain medicines before discharge. If you pain is not adequately controlled with these medications, please call [phone].    Infection: Signs of infection include increasing wound drainage and redness around the wound, as well as a temperature over 101.5 degrees. It is unnecessary to take your temperature on a routine basis. Please call the above number if you are concerned about an infection.    Driving and Work: It is ok to return to driving and work as long as you are not taking narcotic pain medications. Please do not lift over 10 pounds or participate in exercise or sports until cleared by Dr. Perez.    Deep Venous Thrombosis (Blood Clots): Symptoms include swelling in the legs and shortness of breath. Please call the office or proceed to the nearest emergency room if you have any of these symptoms.    Physical Therapy: The best physical therapy after surgery is walking. Please try to walk as much as possible.    Follow-up: You will be scheduled for a follow-up appointment in 2-3 weeks.    Questions: During business hours please call [phone] for routine questions. For after hours questions please call [phone] and ask to speak with the orthopaedic resident on call.

## 2018-10-04 NOTE — DISCHARGE SUMMARY
[facility]  Brief Operative Note     SUMMARY     Surgery Date: 10/4/2018     Surgeon(s) and Role:     * Anjum Perez MD - Primary     * Vini Lindsay MD - Resident - Assisting        Pre-op Diagnosis:  Lumbar radiculopathy [M54.16]    Post-op Diagnosis:  Post-Op Diagnosis Codes:     * Lumbar radiculopathy [M54.16]    Procedure(s) (LRB):  LAMINECTOMY, SPINE, LUMBAR, WITH DISCECTOMY Left L5/S1 SNS: SSEP/EMG [place] + Pads **Homa Retractor?** (Left)    Anesthesia: General    Description of the findings of the procedure: see op note    Findings/Key Components: see op note    Estimated Blood Loss: * No values recorded between 10/4/2018  3:57 PM and 10/4/2018  4:57 PM *         Specimens:   Specimen (12h ago, onward)    None          Discharge Note    SUMMARY     Admit Date: 10/4/2018    Discharge Date and Time:  10/04/2018 4:57 PM    Hospital Course:  The patient arrived to the [facility] on the second floor of [facility] for proper pre-operative management.  Upon completion of pre-operative preparation, the patient was taken back to the operative theatre.  A L5/1 discectomy was performed without complication and the patient was transported to the post anesthesia care unit in stable condition.     Aguilar: nil    Drain: nil    Pain Management:     - Regional Anesthetics: yes    After appropriate recovery from the anaesthetic agents used during the surgery the patient was discharged home without complications      Final Diagnosis: Post-Op Diagnosis Codes:     * Lumbar radiculopathy [M54.16]    Disposition: Home or Self Care    Follow Up/Patient Instructions:     Medications:  Reconciled Home Medications:      Medication List      START taking these medications    docusate sodium 100 MG capsule  Commonly known as:  COLACE  Take 1 capsule (100 mg total) by mouth 2 (two) times daily. Available OTC as well     ondansetron 8 MG tablet  Commonly known as:  ZOFRAN  Take 1  tablet (8 mg total) by mouth every 12 (twelve) hours as needed for Nausea.     oxyCODONE-acetaminophen 5-325 mg per tablet  Commonly known as:  PERCOCET  Take 1 tablet by mouth every 4 (four) hours as needed for Pain.        CONTINUE taking these medications    busPIRone 10 MG tablet  Commonly known as:  BUSPAR  20 mg.     cetirizine 10 MG tablet  Commonly known as:  ZYRTEC  TAKE 1 TABLET BY MOUTH ONCE EVERY MORNING     CLARINEX ORAL  Take 25 mg by mouth.     clonazePAM 1 MG tablet  Commonly known as:  KLONOPIN  Take 1 mg by mouth 2 (two) times daily.     cloZAPine 25 MG Tab  Commonly known as:  CLOZARIL  clozapine 25 mg tablet   Take 1 tablet every day by oral route.     desog-e.estradiol/e.estradiol 0.15-0.02 mgx21 /0.01 mg x 5 per tablet  Commonly known as:  KARIVA (28)  Take 1 tablet by mouth once daily.     * FLUoxetine 20 MG capsule  Commonly known as:  PROZAC  Take by mouth once daily.     * FLUoxetine 40 MG capsule  Commonly known as:  PROZAC  TAKE 1 CAPSULE BY MOUTH 1 TIME DAILY     hydrOXYzine pamoate 25 MG Cap  Commonly known as:  VISTARIL     * methylphenidate HCl 36 MG CR tablet  Commonly known as:  CONCERTA  Take 36 mg by mouth once daily.     * methylphenidate HCl 27 MG CR tablet  Commonly known as:  CONCERTA  Take 36 mg by mouth every morning.     naproxen 500 MG tablet  Commonly known as:  NAPROSYN  Take 1 tablet (500 mg total) by mouth 2 (two) times daily with meals. PRN pain, take with food     PONSTEL 250 mg Cap  Generic drug:  mefenamic acid  Take 250 mg by mouth 3 (three) times daily as needed.         * This list has 4 medication(s) that are the same as other medications prescribed for you. Read the directions carefully, and ask your doctor or other care provider to review them with you.            STOP taking these medications    naproxen sodium 220 MG tablet  Commonly known as:  ANAPROX     ondansetron 4 MG Tbdl  Commonly known as:  ZOFRAN-ODT          Discharge Procedure Orders   Diet  general     Call MD for:  temperature >100.4     Call MD for:  persistent nausea and vomiting     Call MD for:  severe uncontrolled pain     Call MD for:  difficulty breathing, headache or visual disturbances     Call MD for:  redness, tenderness, or signs of infection (pain, swelling, redness, odor or green/yellow discharge around incision site)     Call MD for:  hives     Call MD for:  persistent dizziness or light-headedness     Call MD for:  extreme fatigue     Other restrictions (specify):   Order Comments: Per instructions sheet     Wound care routine (specify)   Order Comments: Per instructions sheet

## 2018-10-04 NOTE — ANESTHESIA PREPROCEDURE EVALUATION
10/04/2018  Malachi Weston is a 18 y.o., female.      Pre-operative evaluation for Procedure(s) (LRB):  LAMINECTOMY, SPINE, LUMBAR, WITH DISCECTOMY Left L5/S1 SNS: SSEP/EMG [place] + Pads **Homa Retractor?** (Left)    Review of patient's allergies indicates:   Allergen Reactions    Perfume ht52 Anaphylaxis       No current facility-administered medications on file prior to encounter.      Current Outpatient Medications on File Prior to Encounter   Medication Sig Dispense Refill    mefenamic acid (PONSTEL) 250 mg Cap Take 250 mg by mouth 3 (three) times daily as needed.      busPIRone (BUSPAR) 10 MG tablet 20 mg.      cetirizine (ZYRTEC) 10 MG tablet TAKE 1 TABLET BY MOUTH ONCE EVERY MORNING  6    clonazePAM (KLONOPIN) 1 MG tablet Take 1 mg by mouth 2 (two) times daily.  1    cloZAPine (CLOZARIL) 25 MG Tab clozapine 25 mg tablet   Take 1 tablet every day by oral route.      DESLORATADINE (CLARINEX ORAL) Take 25 mg by mouth.      desogestrel-ethinyl estradiol (KARIVA, 28,) 0.15-0.02mg x21 /0.01 mg x 5 per tablet Take 1 tablet by mouth once daily. 28 tablet 12    fluoxetine (PROZAC) 20 MG capsule Take by mouth once daily.  2    fluoxetine (PROZAC) 40 MG capsule TAKE 1 CAPSULE BY MOUTH 1 TIME DAILY  2    hydrOXYzine pamoate (VISTARIL) 25 MG Cap       methylphenidate (CONCERTA) 27 MG CR tablet Take 36 mg by mouth every morning.       methylphenidate (CONCERTA) 36 MG CR tablet Take 36 mg by mouth once daily.  0    naproxen (NAPROSYN) 500 MG tablet Take 1 tablet (500 mg total) by mouth 2 (two) times daily with meals. PRN pain, take with food 60 tablet 2    naproxen sodium (ANAPROX) 220 MG tablet Take 220 mg by mouth 2 (two) times daily as needed.       ondansetron (ZOFRAN-ODT) 4 MG TbDL DISSOLVE ONE TABLET ON THE TONGUE EVERY 8 HOURS AS NEEDED FOR NAUSEA  0       Patient Active Problem  List   Diagnosis    Peroneal neuropathy    Anxiety    Depression    Constipation - functional    Headache(784.0)    Abdominal pain, generalized    Irritable bowel syndrome    Palpitations    Syncope    Orthostatic hypotension    Back pain    Right wrist pain    TFCC (triangular fibrocartilage complex) injury    Bilateral low back pain    Lumbar radiculopathy       Past Surgical History:   Procedure Laterality Date    ANKLE FRACTURE SURGERY      EXPLORATION, NERVE Right 11/21/2012    Performed by Catracho Leiva MD at [facility] OR [address] [place] [address]    MOUTH SURGERY      TIBIA FRACTURE SURGERY  2012    screw removal           No results for input(s): HCT in the last 72 hours.  No results for input(s): PLT in the last 72 hours.  No results for input(s): K in the last 72 hours.  No results for input(s): CREATININE in the last 72 hours.  No results for input(s): GLU in the last 72 hours.  No results for input(s): PT in the last 72 hours.                  11/2012 #4 LMA, easy  Anesthesia Evaluation         Review of Systems  Anesthesia Hx:  No problems with previous Anesthesia    Social:  Non-Smoker, Alcohol Use    Hematology/Oncology:     Oncology Normal    -- Anemia:   Cardiovascular:   Denies Hypertension.  Denies MI.    Denies Angina.    Pulmonary:   Denies COPD.  Denies Asthma. Shortness of breath  Denies Sleep Apnea. Walks 3-5 miles throught day,  Gets SOB sometimes if she stands up too fast has improved with increased salt intake and exercise.   Renal/:   Denies Chronic Renal Disease.     Hepatic/GI:   Denies Liver Disease.    Neurological:   Denies TIA. Denies CVA. Denies Seizures. RLE neuropathy due to trauma   Endocrine:   Denies Diabetes.        Physical Exam  General:  Well nourished    Airway/Jaw/Neck:  Airway Findings: Mouth Opening: Normal Tongue: Normal  General Airway Assessment: Adult, Average  Mallampati: II  TM Distance: Normal, at least 6 cm  Jaw/Neck Findings:  Neck ROM: Normal ROM        Chest/Lungs:  Chest/Lungs Findings: Clear to auscultation     Heart/Vascular:  Heart Findings: Rate: Normal  Rhythm: Regular Rhythm  Sounds: Normal        Mental Status:  Mental Status Findings:  Cooperative, Alert and Oriented         Anesthesia Plan  Type of Anesthesia, risks & benefits discussed:  Anesthesia Type:  general  Patient's Preference:   Intra-op Monitoring Plan:   Intra-op Monitoring Plan Comments:   Post Op Pain Control Plan:   Post Op Pain Control Plan Comments: As per surgeon's plan  Induction:   IV  Beta Blocker:  Patient is not currently on a Beta-Blocker (No further documentation required).       Informed Consent: Patient understands risks and agrees with Anesthesia plan.  Questions answered. Anesthesia consent signed with patient.  ASA Score: 2     Day of Surgery Review of History & Physical:    H&P update referred to the surgeon.         Ready For Surgery From Anesthesia Perspective.

## 2018-10-05 ENCOUNTER — PATIENT MESSAGE (OUTPATIENT)
Dept: ORTHOPEDICS | Facility: CLINIC | Age: 18
End: 2018-10-05

## 2018-10-05 VITALS
RESPIRATION RATE: 16 BRPM | BODY MASS INDEX: 31.41 KG/M2 | SYSTOLIC BLOOD PRESSURE: 111 MMHG | OXYGEN SATURATION: 95 % | WEIGHT: 207.25 LBS | HEART RATE: 86 BPM | HEIGHT: 68 IN | TEMPERATURE: 99 F | DIASTOLIC BLOOD PRESSURE: 60 MMHG

## 2018-10-05 PROCEDURE — 63600175 PHARM REV CODE 636 W HCPCS: Performed by: STUDENT IN AN ORGANIZED HEALTH CARE EDUCATION/TRAINING PROGRAM

## 2018-10-05 PROCEDURE — 97530 THERAPEUTIC ACTIVITIES: CPT

## 2018-10-05 PROCEDURE — 97161 PT EVAL LOW COMPLEX 20 MIN: CPT

## 2018-10-05 PROCEDURE — G8988 SELF CARE GOAL STATUS: HCPCS | Mod: CJ

## 2018-10-05 PROCEDURE — 25000003 PHARM REV CODE 250: Performed by: STUDENT IN AN ORGANIZED HEALTH CARE EDUCATION/TRAINING PROGRAM

## 2018-10-05 PROCEDURE — G8987 SELF CARE CURRENT STATUS: HCPCS | Mod: CJ

## 2018-10-05 PROCEDURE — G0378 HOSPITAL OBSERVATION PER HR: HCPCS

## 2018-10-05 PROCEDURE — 97165 OT EVAL LOW COMPLEX 30 MIN: CPT

## 2018-10-05 PROCEDURE — G8989 SELF CARE D/C STATUS: HCPCS | Mod: CJ

## 2018-10-05 RX ADMIN — ACETAMINOPHEN 1000 MG: 10 INJECTION, SOLUTION INTRAVENOUS at 01:10

## 2018-10-05 RX ADMIN — ACETAMINOPHEN 1000 MG: 10 INJECTION, SOLUTION INTRAVENOUS at 05:10

## 2018-10-05 RX ADMIN — OXYCODONE HYDROCHLORIDE 10 MG: 10 TABLET ORAL at 05:10

## 2018-10-05 RX ADMIN — OXYCODONE HYDROCHLORIDE 10 MG: 10 TABLET ORAL at 01:10

## 2018-10-05 RX ADMIN — METHOCARBAMOL 750 MG: 750 TABLET ORAL at 08:10

## 2018-10-05 RX ADMIN — OXYCODONE HYDROCHLORIDE 10 MG: 10 TABLET ORAL at 09:10

## 2018-10-05 RX ADMIN — FAMOTIDINE 20 MG: 20 TABLET ORAL at 08:10

## 2018-10-05 NOTE — PT/OT/SLP EVAL
Physical Therapy Evaluation and Discharge Note    Patient Name:  Malachi Weston   MRN:  [id number]    Recommendations:     Discharge Recommendations:  home health PT   Discharge Equipment Recommendations: shower chair   Barriers to discharge: None    Assessment:     Malachi Weston is a 18 y.o. female admitted with a medical diagnosis of Lumbar radiculopathy. .  At this time, patient is functioning at their prior level of function and does not require further acute PT services.     Recent Surgery: Procedure(s) (LRB):  LAMINECTOMY, SPINE, LUMBAR, WITH DISCECTOMY Left L5/S1 SNS: SSEP/EMG [place] + Pads **Homa Retractor?** (Left) 1 Day Post-Op    Plan:     During this hospitalization, patient does not require further acute PT services.  Please re-consult if situation changes.      Subjective     Chief Complaint: LBP and dizziness  Patient/Family Comments/goals: to go home  Pain/Comfort:  · Pain Rating 1: 7/10  · Location - Orientation 1: generalized  · Location 1: back  · Pain Addressed 1: Pre-medicate for activity, Reposition, Cessation of Activity  · Pain Rating Post-Intervention 1: 7/10    Patients cultural, spiritual, Worship conflicts given the current situation: no    Living Environment:  Pt. Lives with boyfriend in 2-story apartment with stairs to bedroom and shower; however, pt. Will be staying with her grandparents upon discharge in [facility] with 2 small steps to enter.  Prior to admission, patients level of function was indep.  Equipment used at home: none(access to RW).  Upon discharge, patient will have assistance from grandparents.    Objective:     Communicated with nursing prior to session.  Patient found supine upon PT entry to room found with: peripheral IV     General Precautions: Standard, fall   Orthopedic Precautions:spinal precautions   Braces:       Exams:  · RUE ROM: WFL  · RUE Strength: WFL  · LUE ROM: WFL  · LUE Strength: WFL  · RLE ROM: WFL  · RLE Strength: WFL  · LLE ROM:  WFL  · LLE Strength: WFL    Functional Mobility:  · Bed Mobility:     · Rolling Right: contact guard assistance  · Scooting: contact guard assistance  · Supine to Sit: contact guard assistance  · Sit to Supine: contact guard assistance  · Transfers:     · Sit to Stand:  contact guard assistance with rolling walker  · Gait: 80' with RW and CGA and multiple standing rest breaks due to pain/dizziness  · Balance: fair+  · Stairs:  Pt ascended/descended 2 stair(s) with HHA with no handrails with Contact Guard Assistance.     AM-PAC 6 CLICK MOBILITY  Total Score:18       Therapeutic Activities and Exercises:   Discussed therapy/DME needs, goals, bed mobility/transfer techniques, and POC.    AM-PAC 6 CLICK MOBILITY  Total Score:18     Patient left supine with all lines intact and call button in reach.    GOALS:   Multidisciplinary Problems     Physical Therapy Goals     Not on file          Multidisciplinary Problems (Resolved)        Problem: Physical Therapy Goal    Goal Priority Disciplines Outcome Goal Variances Interventions   Physical Therapy Goal   (Resolved)     PT, PT/OT Outcome(s) achieved                     History:     Past Medical History:   Diagnosis Date    ADHD (attention deficit hyperactivity disorder)     Anxiety     Broken leg 2009    right     Constipation - functional     Depression     Depression     Dyslexia     Headache(784.0)     Herniated disc     Hypotension arterial     Nerve damage     right leg    Panic disorder        Past Surgical History:   Procedure Laterality Date    ANKLE FRACTURE SURGERY      EXPLORATION, NERVE Right 11/21/2012    Performed by Catracho Leiva MD at [facility] OR [address] [place] [address]    MOUTH SURGERY      TIBIA FRACTURE SURGERY  2012    screw removal       Clinical Decision Making:     History  Co-morbidities and personal factors that may impact the plan of care Examination  Body Structures and Functions, activity limitations and participation restrictions that may impact  the plan of care Clinical Presentation   Decision Making/ Complexity Score   Co-morbidities:   [] Time since onset of injury / illness / exacerbation  [] Status of current condition  []Patient's cognitive status and safety concerns    [] Multiple Medical Problems (see med hx)  Personal Factors:   [] Patient's age  [] Prior Level of function   [] Patient's home situation (environment and family support)  [] Patient's level of motivation  [] Expected progression of patient      HISTORY:(criteria)    [] 30371 - no personal factors/history    [] 86795 - has 1-2 personal factor/comorbidity     [] 74464 - has >3 personal factor/comorbidity     Body Regions:  [] Objective examination findings  [] Head     []  Neck  [] Trunk   [] Upper Extremity  [] Lower Extremity    Body Systems:  [] For communication ability, affect, cognition, language, and learning style: the assessment of the ability to make needs known, consciousness, orientation (person, place, and time), expected emotional /behavioral responses, and learning preferences (eg, learning barriers, education  needs)  [] For the neuromuscular system: a general assessment of gross coordinated movement (eg, balance, gait, locomotion, transfers, and transitions) and motor function  (motor control and motor learning)  [] For the musculoskeletal system: the assessment of gross symmetry, gross range of motion, gross strength, height, and weight  [] For the integumentary system: the assessment of pliability(texture), presence of scar formation, skin color, and skin integrity  [] For cardiovascular/pulmonary system: the assessment of heart rate, respiratory rate, blood pressure, and edema     Activity limitations:    [] Patient's cognitive status and saf ety concerns          [] Status of current condition      [] Weight bearing restriction  [] Cardiopulmunary Restriction    Participation Restrictions:   [] Goals and goal agreement with the patient     [] Rehab potential  (prognosis) and probable outcome      Examination of Body System: (criteria)    [] 22099 - addressing 1-2 elements    [] 93025 - addressing a total of 3 or more elements     [] 37073 -  Addressing a total of 4 or more elements         Clinical Presentation: (criteria)  Choose one     On examination of body system using standardized tests and measures patient presents with (CHOOSE ONE) elements from any of the following: body structures and functions, activity limitations, and/or participation restrictions.  Leading to a clinical presentation that is considered (CHOOSE ONE)                              Clinical Decision Making  (Eval Complexity):  Choose One     Time Tracking:     PT Received On: 10/05/18  PT Start Time: 1252     PT Stop Time: 1324  PT Total Time (min): 32 min     Billable Minutes: Evaluation 22 and Therapeutic Activity 10      Vini Simms, PT  10/05/2018

## 2018-10-05 NOTE — ASSESSMENT & PLAN NOTE
18 y.o. female POD 1 status post: L5/1 discectomy     Pain control  PT/OT: WBAT   DVT PPx: SCDs at all times when not ambulating  Abx: Postop       Dispo: PT/OT this am. Plan for discharge home following PT

## 2018-10-05 NOTE — PROGRESS NOTES
"[facility]  Orthopedics  Progress Note    Patient Name: Malachi Weston  MRN: [id number]  Admission Date: 10/4/2018  Hospital Length of Stay: 0 days  Attending Provider: Anjum Perez MD  Primary Care Provider: Primary Doctor No  Follow-up For: Procedure(s) (LRB):  LAMINECTOMY, SPINE, LUMBAR, WITH DISCECTOMY Left L5/S1 SNS: SSEP/EMG [place] + Pads **Homa Retractor?** (Left)    Post-Operative Day: 1 Day Post-Op  Subjective:     Principal Problem:Lumbar radiculopathy    Principal Orthopedic Problem: AS above, s/p L5/1 discectomy    Interval History: NAEON, Painc controlled, mild dizziness overnight likely secondary to narcotic usage    Review of patient's allergies indicates:   Allergen Reactions    Perfume ht52 Anaphylaxis       Current Facility-Administered Medications   Medication    0.9%  NaCl infusion    acetaminophen (10 mg/mL) injection 1,000 mg    acetaminophen tablet 650 mg    diphenhydrAMINE capsule 25 mg    famotidine tablet 20 mg    HYDROmorphone injection 1 mg    methocarbamol tablet 750 mg    ondansetron injection 4 mg    ondansetron injection 4 mg    oxyCODONE immediate release tablet 10 mg    oxyCODONE immediate release tablet 10 mg    oxyCODONE immediate release tablet 5 mg    oxyCODONE immediate release tablet 5 mg    promethazine (PHENERGAN) 6.25 mg in dextrose 5 % 50 mL IVPB    sodium chloride 0.9% flush 3 mL    sodium chloride 0.9% flush 5 mL    sodium chloride 0.9% flush 5 mL     Objective:     Vital Signs (Most Recent):  Temp: 96.8 °F (36 °C) (10/05/18 0836)  Pulse: 77 (10/05/18 0836)  Resp: 18 (10/05/18 0836)  BP: 119/73 (10/05/18 0836)  SpO2: 97 % (10/05/18 0836) Vital Signs (24h Range):  Temp:  [96.8 °F (36 °C)-98.3 °F (36.8 °C)] 96.8 °F (36 °C)  Pulse:  [56-93] 77  Resp:  [12-18] 18  SpO2:  [94 %-100 %] 97 %  BP: (104-147)/(56-91) 119/73     Weight: 94 kg (207 lb 3.7 oz)  Height: 5' 8" (172.7 cm)  Body mass index is 31.51 " kg/m².      Intake/Output Summary (Last 24 hours) at 10/5/2018 0901  Last data filed at 10/5/2018 0509  Gross per 24 hour   Intake 730 ml   Output 2 ml   Net 728 ml       Ortho/SPM Exam    PE:    AA&O x 4.  NAD  HEENT:  NCAT, sclera nonicteric  Lungs:  Respirations are equal and unlabored.  CV:  2+ bilateral upper and lower extremity pulses.  Skin:  Intact throughout.    MSK:    Dressings CDI  Motor 5/5  SILT +ve          Assessment/Plan:     * Lumbar radiculopathy    18 y.o. female POD 1 status post: L5/1 discectomy     Pain control  PT/OT: WBAT   DVT PPx: SCDs at all times when not ambulating  Abx: Postop       Dispo: PT/OT this am. Plan for discharge home following PT                  Vini Lindsay MD  Orthopedics  [facility]

## 2018-10-05 NOTE — DISCHARGE SUMMARY
[facility]  Orthopedics  Discharge Summary      Patient Name: Malachi Weston  MRN: [id number]  Admission Date: 10/4/2018  Hospital Length of Stay: 0 days  Discharge Date and Time:  10/05/2018 9:06 AM  Attending Physician: Anjum Perez MD   Discharging Provider: Vini Lindsay MD  Primary Care Provider: Primary Doctor No      Procedure(s) (LRB):  LAMINECTOMY, SPINE, LUMBAR, WITH DISCECTOMY Left L5/S1 SNS: SSEP/EMG [place] + Pads **Homa Retractor?** (Left)      Hospital Course:  On 10/4, the patient arrived to the [facility] on the second floor of [facility] for proper pre-operative management.  Upon completion of pre-operative preparation, the patient was taken back to the operative theatre.  A discectomy was performed without complication and the patient was transported to the post anesthesia care unit in stable condition.   Aguilar: nil    Drain: nil    Pain Management:     - Regional Anesthetics: yes    After appropriate recovery from the anaesthetic agents used during the surgery the patient was transported to the IP floor for overnight observation. The duration of said observation period was without complication and the patient was discharged home on post-operative day one without complication.          Significant Diagnostic Studies:     Pending Diagnostic Studies:     None        Final Active Diagnoses:    Diagnosis Date Noted POA    PRINCIPAL PROBLEM:  Lumbar radiculopathy [M54.16] 09/12/2018 Yes      Problems Resolved During this Admission:      Discharged Condition: fair    Disposition: Home or Self Care    Follow Up:    Patient Instructions:      Diet general     Call MD for:  temperature >100.4     Call MD for:  persistent nausea and vomiting     Call MD for:  severe uncontrolled pain     Call MD for:  difficulty breathing, headache or visual disturbances     Call MD for:  redness, tenderness, or signs of infection (pain, swelling, redness, odor or  green/yellow discharge around incision site)     Call MD for:  hives     Call MD for:  persistent dizziness or light-headedness     Call MD for:  extreme fatigue     Other restrictions (specify):   Order Comments: Per instructions sheet     Wound care routine (specify)   Order Comments: Per instructions sheet     Change dressing (specify)   Order Comments: Dressing change: per instructions sheet     Other restrictions (specify):   Order Comments: Per instructions sheet     Notify your health care provider if you experience any of the following:  increased confusion or weakness     Notify your health care provider if you experience any of the following:  persistent dizziness, light-headedness, or visual disturbances     Notify your health care provider if you experience any of the following:  worsening rash     Notify your health care provider if you experience any of the following:  severe persistent headache     Notify your health care provider if you experience any of the following:  difficulty breathing or increased cough     Notify your health care provider if you experience any of the following:  redness, tenderness, or signs of infection (pain, swelling, redness, odor or green/yellow discharge around incision site)     Notify your health care provider if you experience any of the following:  severe uncontrolled pain     Notify your health care provider if you experience any of the following:  persistent nausea and vomiting or diarrhea     Notify your health care provider if you experience any of the following:  temperature >100.4     Medications:  Reconciled Home Medications:      Medication List      START taking these medications    ondansetron 8 MG tablet  Commonly known as:  ZOFRAN  Take 1 tablet (8 mg total) by mouth every 12 (twelve) hours as needed for Nausea.     oxyCODONE-acetaminophen 5-325 mg per tablet  Commonly known as:  PERCOCET  Take 1 tablet by mouth every 4 (four) hours as needed for Pain.      STOOL SOFTENER 100 MG capsule  Generic drug:  docusate sodium  Take 1 capsule (100 mg total) by mouth 2 (two) times daily.        CONTINUE taking these medications    busPIRone 10 MG tablet  Commonly known as:  BUSPAR  20 mg.     cetirizine 10 MG tablet  Commonly known as:  ZYRTEC  TAKE 1 TABLET BY MOUTH ONCE EVERY MORNING     CLARINEX ORAL  Take 25 mg by mouth.     clonazePAM 1 MG tablet  Commonly known as:  KLONOPIN  Take 1 mg by mouth 2 (two) times daily.     cloZAPine 25 MG Tab  Commonly known as:  CLOZARIL  clozapine 25 mg tablet   Take 1 tablet every day by oral route.     desog-e.estradiol/e.estradiol 0.15-0.02 mgx21 /0.01 mg x 5 per tablet  Commonly known as:  KARIVA (28)  Take 1 tablet by mouth once daily.     * FLUoxetine 20 MG capsule  Commonly known as:  PROZAC  Take by mouth once daily.     * FLUoxetine 40 MG capsule  Commonly known as:  PROZAC  TAKE 1 CAPSULE BY MOUTH 1 TIME DAILY     hydrOXYzine pamoate 25 MG Cap  Commonly known as:  VISTARIL     * methylphenidate HCl 36 MG CR tablet  Commonly known as:  CONCERTA  Take 36 mg by mouth once daily.     * methylphenidate HCl 27 MG CR tablet  Commonly known as:  CONCERTA  Take 36 mg by mouth every morning.     naproxen 500 MG tablet  Commonly known as:  NAPROSYN  Take 1 tablet (500 mg total) by mouth 2 (two) times daily with meals. PRN pain, take with food     PONSTEL 250 mg Cap  Generic drug:  mefenamic acid  Take 250 mg by mouth 3 (three) times daily as needed.         * This list has 4 medication(s) that are the same as other medications prescribed for you. Read the directions carefully, and ask your doctor or other care provider to review them with you.            STOP taking these medications    naproxen sodium 220 MG tablet  Commonly known as:  ANAPROX     ondansetron 4 MG Tbdl  Commonly known as:  ZOFRAN-KELLE Lindsay MD  Orthopedics  [facility]

## 2018-10-05 NOTE — PT/OT/SLP EVAL
Occupational Therapy   Evaluation and Discharge Note    Name: Malachi Weston  MRN: [id number]  Admitting Diagnosis:  Lumbar radiculopathy 1 Day Post-Op    Recommendations:     Discharge Recommendations: home health OT  Discharge Equipment Recommendations:  shower chair  Barriers to discharge:  None    History:     Occupational Profile:  Living Environment: Pt lives in 2-story apt with boyfriend and friend but plans to d/c to grandparents home (1SH with 2STE and 0HR). Grandparents house has walk-in shower  Previous level of function: PTA, pt reports independence with all ADL and IADL, including driving. Pt was not using any AD for ambulation or ADLs. Pt works full-time  Roles and Routines: granddaughter, girlfriend, sister,   Equipment Owned:  none(access to )  Assistance upon Discharge: Grandparents and boyfriend to assist following d/c     Past Medical History:   Diagnosis Date    ADHD (attention deficit hyperactivity disorder)     Anxiety     Broken leg 2009    right     Constipation - functional     Depression     Depression     Dyslexia     Headache(784.0)     Herniated disc     Hypotension arterial     Nerve damage     right leg    Panic disorder        Past Surgical History:   Procedure Laterality Date    ANKLE FRACTURE SURGERY      EXPLORATION, NERVE Right 11/21/2012    Performed by Catracho Leiva MD at [facility] OR [address] [place] [address]    MOUTH SURGERY      TIBIA FRACTURE SURGERY  2012    screw removal       Subjective     Chief Complaint: dizziness, pain  Patient/Family stated goals: go home, return to [facility]   Communicated with: RN prior to session.  Pain/Comfort:  · Pain Rating 1: 7/10  · Location - Side 1: Bilateral  · Location - Orientation 1: lower  · Location 1: back  · Pain Addressed 1: Pre-medicate for activity, Reposition, Distraction, Cessation of Activity  · Pain Rating Post-Intervention 1: 7/10    Patients cultural, spiritual, Taoist conflicts given the current situation: none  reported    Objective:     Patient found with: peripheral IV    General Precautions: Standard, fall   Orthopedic Precautions:spinal precautions   Braces: N/A     Occupational Performance:    Bed Mobility:    · Patient completed Supine to Sit with contact guard assistance and with side rail  · Patient completed Sit to Supine with contact guard assistance and with side rail    Functional Mobility/Transfers:  · Patient completed Sit <> Stand Transfer with contact guard assistance  with  rolling walker   · Functional Mobility: Pt ambulate 80 ft with CGA using RW; pt ascend/descend 2 stairs with CGA using HHA    Activities of Daily Living:  · Upper Body Dressing: setup assistance to don backward gown while seated EOB    Cognitive/Visual Perceptual:  Cognitive/Psychosocial Skills:     -       Oriented to: Person, Place and Situation   -       Follows Commands/attention:Follows multistep  commands  -       Communication: clear/fluent  -       Memory: No Deficits noted  -       Safety awareness/insight to disability: intact   -       Mood/Affect/Coping skills/emotional control: Appropriate to situation  Visual/Perceptual:      -Pt wears glasses; hearing intact    Physical Exam:  Balance:    -       good sitting/standing balance  Postural examination/scapula alignment:    -       No postural abnormalities identified  Skin integrity: Visible skin intact  Edema:  None noted  Sensation:    -       Intact UE and LE  Dominant hand:    -       R hand  Upper Extremity Range of Motion:     -       Right Upper Extremity: WFL   -       Left Upper Extremity: WFL  Upper Extremity Strength:    -       Right Upper Extremity: WFL  -       Left Upper Extremity: WFL    Strength:    -       Right Upper Extremity: WFL   -       Left Upper Extremity: WFL   Fine Motor Coordination:    -       Intact  Gross motor coordination:   WFL    Patient left HOB elevated with all lines intact, call button in reach and grandparents, brother, boyfriend  "present and RN notified    [facility] 6 Click:  [facility] Total Score: 22    Treatment & Education:  Pt educated on role of OT/POC  Pt educated on importance of ambulation/UIC  Pt educated on spinal precautions and DME for home use  White board/communication board updated  Education:    Assessment:     Malachi Weston is a 18 y.o. female with a medical diagnosis of Lumbar radiculopathy. At this time, patient is functioning at their prior level of function and does not require further acute OT services.     Clinical Decision Makin.  OT Low:  "Pt evaluation falls under low complexity for evaluation coding due to performance deficits noted in 1-3 areas as stated above and 0 co-morbities affecting current functional status. Data obtained from problem focused assessments. No modifications or assistance was required for completion of evaluation. Only brief occupational profile and history review completed."     Plan:     During this hospitalization, patient does not require further acute OT services.  Please re-consult if situation changes.    · Plan of Care Reviewed with: patient, grandparent    This Plan of care has been discussed with the patient who was involved in its development and understands and is in agreement with the identified goals and treatment plan    GOALS:   Multidisciplinary Problems     Occupational Therapy Goals     Not on file                Time Tracking:     OT Date of Treatment: 10/05/18  OT Start Time: 1252  OT Stop Time: 1322  OT Total Time (min): 30 min    Billable Minutes:Evaluation 30    Yanni Rosado OT  10/5/2018    "

## 2018-10-05 NOTE — PLAN OF CARE
Problem: Physical Therapy Goal  Goal: Physical Therapy Goal  Outcome: Outcome(s) achieved Date Met: 10/05/18  No goals set

## 2018-10-05 NOTE — NURSING TRANSFER
Nursing Transfer Note      10/4/2018     Transfer To: 1048A from [facility] 28    Transfer via stretcher    Transfer with SL and belongings    Transported by Patient transport    Medicines sent: None    Chart send with patient: Yes    Notified: Report called to Hanna MONAHAN    Patient reassessed at: 2100. Awake and alert. VSS. POWELL's well. Patient being admitted for pain control. Denies nausea. Boyfriend at side. Stable condition.  Upon arrival to floor: bed in lowest position

## 2018-10-05 NOTE — PLAN OF CARE
Problem: Patient Care Overview  Goal: Plan of Care Review  Outcome: Ongoing (interventions implemented as appropriate)  Pt aware of plan for d/c today. Pain well managed with PO pain meds. Requires assistance with OOB acitivities d/t dizziness. Awaiting PT/OT eval prior to d/c. Calls appropriately, no falls. VSS

## 2018-10-05 NOTE — PROGRESS NOTES
Discharge instructions and Rx given to and reviewed with patient and grandparents. Questions answered. Transport request for discharge escort.

## 2018-10-05 NOTE — OP NOTE
DATE OF PROCEDURE: 10/4/2018.     SURGEON: Anjum Perez M.D.     FIRST ASSISTANT SURGEON: Vini Lindsay MD, PGY-5     PREOPERATIVE DIAGNOSIS: Left Lumbar Radiculopathy.     POSTOPERATIVE DIAGNOSIS: Left Lumbar Radiculopathy.     PROCEDURES PERFORMED:  1. Lumbar laminotomy, foraminotomy and disectomy Left L5/S1     ANESTHESIA: General endotracheal anesthesia.     ESTIMATED BLOOD LOSS: 50 mL.     IMPLANTS: None.     SPECIMENS: None.     FINDINGS: None.     DRAINS: None.     COMPLICATIONS: None.     SPONGE AND NEEDLE COUNT: Correct x2.     NEUROLOGICAL MONITORING: Somatosensory evoked  potential, free-running EMG.  There were no changes to SSEP baselines.  There no significant EMG activity.     REASON FOR OPERATION AND BRIEF HISTORY AND PHYSICAL: Malachi Cortez a    18 y.o. female with refractory lumbar radiculopathy secondary to a Left L5/S1 disc herniation     DESCRIPTION OF INFORMED CONSENT: Please see my last clinic note for a full   description of the informed consent I had with the patient as well as her parents.     DESCRIPTION OF PROCEDURE: The patient was met in the preoperative area where   her low back was marked in the midline by me personally. Subsequently, she was   brought to the Operating Room where sequential compression devices were placed   on the patient's bilateral calves and run throughout the case. The patient was   then intubated via general endotracheal anesthesia. They were then flipped prone   onto a Landry table with Willis frame. The head was secured on a facial pillow. The eyes were personally checked by josiah found to be acceptable. The arms were held in a 90-90 position. All bony prominences were padded paying special attention to the axilla, elbows, hands, breasts, knees, and feet. Being satisfied with positioning and confirming this to be adequate with Anesthesia, the patient's lower back was prepped and draped in normal sterile fashion.     A full timeout was then called  identifying the patient, the procedural site and   level, the availability of all instruments and no specific nursing, surgical,   anesthetic or neurological monitoring concerns. Finding that it was safe to   proceed with surgery, the patient was given a weight-appropriate dose of   antibiotics by the Anesthesia Service.     I then infiltrated my planned incision with 10 mL of 1% lidocaine. I then made   a midline lumbar incision and carried dissection down through the skin and  subcutaneous tissues using combination of sharp dissection and electrocautery   where necessary. The dorsal fascia of the lumbar spine was identified and   incised in the midline and I performed a preliminary subperiosteal exposure of   what I took to be the Left L5 lamina as well as what I took to be   the Left L5/S1 facet joint. At the conclusion of my preliminary dissection, I   placed a [place] 4 elevator under what I took to be the trailing edge of the L5  lamina, took lateral radiograph and thus confirmed my level.     At this point, I finalized my exposure. I then used a high-speed drill to thin   the trailing one-third of the L5 lamina to reveal the origin of the ligamentum   flavum. I then released the ligamentum flavum from the trailing edge of the L5  lamina using a forward-angle curette. I gently worked the ligamentum flavum   distally. This allowed me to visualize the underlying epidural fat and subsequently blue-whitedura. I used the [place] 4 elevator to move the thecal sac towards the   midline and brought in a nerve root retractor. An obvious disk bulge was then   seen in this area and I entered it with a disc knife via a vertical annulotomy. Multiple soft   fragments were removed with a pituitary. At the conclusion of my diskectomy, I   could easily pass a [place] elevator along the S1 nerve root ventrally as well   as across the thecal sac. The wound was then thoroughly irrigated, including   irrigation of the disk  space, which revealed no residual free fragments. I then  finalized hemostasis with FloSeal and patties. Valsalva maneuver to 40 mmHg   demonstrated no cerebrospinal fluid leak. Next, 500 mg of vancomycin powder was  placed in the deep space and deep fascia was closed with #1 Vicryl in a   figure-of-eight fashion. The superficial layer was then irrigated and closed   over a drain with 2-0 Vicryl, 3-0 Monocryl, Dermabond and Steri-Strips. A soft   dressing was placed. The patient was then flipped supine, extubated and transferred to the Recovery Room in stable condition.

## 2018-10-05 NOTE — ANESTHESIA POSTPROCEDURE EVALUATION
"Anesthesia Post Evaluation    Patient: Malachi Weston    Procedure(s) Performed: Procedure(s) (LRB):  LAMINECTOMY, SPINE, LUMBAR, WITH DISCECTOMY Left L5/S1 SNS: SSEP/EMG [place] + Pads **Homa Retractor?** (Left)    Final Anesthesia Type: general  Patient location during evaluation: floor  Patient participation: Yes- Able to Participate  Level of consciousness: awake and alert and oriented  Post-procedure vital signs: reviewed and stable  Pain management: adequate  Airway patency: patent  PONV status at discharge: No PONV  Anesthetic complications: no      Cardiovascular status: stable  Respiratory status: unassisted, spontaneous ventilation and room air  Hydration status: euvolemic  Follow-up not needed.  Comments: Had some pain earlier, but feeling well now.        Visit Vitals  /74 (BP Location: Right arm, Patient Position: Lying)   Pulse 82   Temp 36.5 °C (97.7 °F) (Oral)   Resp 16   Ht 5' 8" (1.727 m)   Wt 94 kg (207 lb 3.7 oz)   LMP 10/01/2018   SpO2 96%   Breastfeeding? No   BMI 31.51 kg/m²       Pain/Gustavo Score: Pain Assessment Performed: Yes (10/5/2018  3:40 AM)  Presence of Pain: non-verbal indicators absent (10/5/2018  3:40 AM)  Pain Rating Prior to Med Admin: 5 (10/5/2018  5:15 AM)  Gustavo Score: 10 (10/4/2018  6:04 PM)        "

## 2018-10-05 NOTE — PLAN OF CARE
Problem: Patient Care Overview  Goal: Plan of Care Review  Outcome: Ongoing (interventions implemented as appropriate)  Pt report received from TANIYA Marcano in one-day surgery. POC reviewed with patient and boyfriend. Pt verbalized understanding. AAO x 4. VSS on RA, afebrile. Pt c/o pain managed well with PRN meds administered per MAR. Vertical back incision covered with [place] dressing,CDI, no drainage noted. Pt up to BR with assistance to void. Patient on regular diet with no reports of N/V. Pt resting quietly with no signs of distress noted. Boyfriend at BS.. SR up x 2, bed low and locked, call light within reach. RN will continue to monitor.

## 2018-10-05 NOTE — SUBJECTIVE & OBJECTIVE
"Principal Problem:Lumbar radiculopathy    Principal Orthopedic Problem: AS above, s/p L5/1 discectomy    Interval History: NAEON, Painc controlled, mild dizziness overnight likely secondary to narcotic usage    Review of patient's allergies indicates:   Allergen Reactions    Perfume ht52 Anaphylaxis       Current Facility-Administered Medications   Medication    0.9%  NaCl infusion    acetaminophen (10 mg/mL) injection 1,000 mg    acetaminophen tablet 650 mg    diphenhydrAMINE capsule 25 mg    famotidine tablet 20 mg    HYDROmorphone injection 1 mg    methocarbamol tablet 750 mg    ondansetron injection 4 mg    ondansetron injection 4 mg    oxyCODONE immediate release tablet 10 mg    oxyCODONE immediate release tablet 10 mg    oxyCODONE immediate release tablet 5 mg    oxyCODONE immediate release tablet 5 mg    promethazine (PHENERGAN) 6.25 mg in dextrose 5 % 50 mL IVPB    sodium chloride 0.9% flush 3 mL    sodium chloride 0.9% flush 5 mL    sodium chloride 0.9% flush 5 mL     Objective:     Vital Signs (Most Recent):  Temp: 96.8 °F (36 °C) (10/05/18 0836)  Pulse: 77 (10/05/18 0836)  Resp: 18 (10/05/18 0836)  BP: 119/73 (10/05/18 0836)  SpO2: 97 % (10/05/18 0836) Vital Signs (24h Range):  Temp:  [96.8 °F (36 °C)-98.3 °F (36.8 °C)] 96.8 °F (36 °C)  Pulse:  [56-93] 77  Resp:  [12-18] 18  SpO2:  [94 %-100 %] 97 %  BP: (104-147)/(56-91) 119/73     Weight: 94 kg (207 lb 3.7 oz)  Height: 5' 8" (172.7 cm)  Body mass index is 31.51 kg/m².      Intake/Output Summary (Last 24 hours) at 10/5/2018 0901  Last data filed at 10/5/2018 0509  Gross per 24 hour   Intake 730 ml   Output 2 ml   Net 728 ml       Ortho/SPM Exam    PE:    AA&O x 4.  NAD  HEENT:  NCAT, sclera nonicteric  Lungs:  Respirations are equal and unlabored.  CV:  2+ bilateral upper and lower extremity pulses.  Skin:  Intact throughout.    MSK:    Dressings CDI  Motor 5/5  SILT +ve        "

## 2018-10-05 NOTE — PLAN OF CARE
Problem: Occupational Therapy Goal  Goal: Occupational Therapy Goal  Outcome: Outcome(s) achieved Date Met: 10/05/18  Eval completed; no acute OT needs

## 2018-10-08 LAB
BLD PROD TYP BPU: NORMAL
BLOOD UNIT EXPIRATION DATE: NORMAL
BLOOD UNIT TYPE CODE: 6200
BLOOD UNIT TYPE: NORMAL
CODING SYSTEM: NORMAL
DISPENSE STATUS: NORMAL
TRANS ERYTHROCYTES VOL PATIENT: NORMAL ML

## 2018-10-11 ENCOUNTER — TELEPHONE (OUTPATIENT)
Dept: ORTHOPEDICS | Facility: CLINIC | Age: 18
End: 2018-10-11

## 2018-10-11 NOTE — TELEPHONE ENCOUNTER
----- Message from Luis Eduardo Lopez sent at 10/11/2018  8:12 AM CDT -----            Name of Who is Calling:Angelia Weston(grandmother)    What is the request in detail: Pt's grandmother would like to speak with April regarding the surgery the pt had last Thursday. She states she has some concerns. Please call to discuss.      Can the clinic reply by [facility]: no      What Number to Call Back if not in [facility]: [phone]

## 2018-10-19 ENCOUNTER — TELEPHONE (OUTPATIENT)
Dept: ORTHOPEDICS | Facility: CLINIC | Age: 18
End: 2018-10-19

## 2018-10-19 DIAGNOSIS — M54.16 LUMBAR RADICULOPATHY: Primary | ICD-10-CM

## 2018-10-19 NOTE — TELEPHONE ENCOUNTER
----- Message from Olimpia Arias sent at 10/19/2018 11:25 AM CDT -----  Contact: Pt grandmother            Name of Who is Calling: pt grandmother      What is the request in detail: is needing to discuss post op care procedures with staff      Can the clinic reply by [facility]: no      What Number [facility] Call Back [facility]: [phone]

## 2018-10-19 NOTE — TELEPHONE ENCOUNTER
Returned call to patient and requested that she call me back so that I may address any post-op concerns they have.

## 2018-10-25 ENCOUNTER — OFFICE VISIT (OUTPATIENT)
Dept: ORTHOPEDICS | Facility: CLINIC | Age: 18
End: 2018-10-25
Payer: MEDICAID

## 2018-10-25 VITALS
BODY MASS INDEX: 31.42 KG/M2 | HEIGHT: 68 IN | DIASTOLIC BLOOD PRESSURE: 81 MMHG | SYSTOLIC BLOOD PRESSURE: 111 MMHG | HEART RATE: 70 BPM | WEIGHT: 207.31 LBS

## 2018-10-25 DIAGNOSIS — Z98.890 S/P DISKECTOMY: Primary | ICD-10-CM

## 2018-10-25 PROCEDURE — 99024 POSTOP FOLLOW-UP VISIT: CPT | Mod: ,,, | Performed by: PHYSICIAN ASSISTANT

## 2018-10-25 PROCEDURE — 99999 PR PBB SHADOW E&M-EST. PATIENT-LVL III: CPT | Mod: PBBFAC,,, | Performed by: PHYSICIAN ASSISTANT

## 2018-10-25 PROCEDURE — 99213 OFFICE O/P EST LOW 20 MIN: CPT | Mod: PBBFAC | Performed by: PHYSICIAN ASSISTANT

## 2018-10-25 RX ORDER — CYCLOBENZAPRINE HCL 5 MG
5-10 TABLET ORAL 3 TIMES DAILY PRN
Qty: 60 TABLET | Refills: 0 | Status: SHIPPED | OUTPATIENT
Start: 2018-10-25 | End: 2019-03-06 | Stop reason: SDUPTHER

## 2018-10-25 NOTE — PROGRESS NOTES
Date: 10/25/2018    Supervising Physician: Anjum Perez M.D.    Date of Surgery: 10/4/2018    Procedure: Left L5/S1 diskectomy    History: Malachi Weston is seen today for follow-up following the above listed procedure. Overall the patient is doing well but today notes her pain is a 3/10 today.  She says the pain is improved compared to before surgery, but she still has spasms in her legs.  She says they occur less frequently, but are still pretty severe.  She takes percocet occasionally for pain.  She is pleased.   she denies fever, chills, and sweats since the time of the surgery.       Exam:  Incision is healing well, clean, dry and intact.   There is no sign of infection. Neuro exam is stable. No signs of DVT.    Radiographs: no new imaging today.    Assessment/Plan: 3 weeks post op.    Doing well postoperatively. No lifting over 10lbs.  I have given her a note to return to work 11/3 with lifting and bending restrictions.  I have given her a prescription for flexeril for the muscle spasms.     I will plan to see the patient back for the next postop visit in 5 weeks.     Thank you for the opportunity to participate in this patient's care. Please give me a call if there are any concerns or questions.

## 2018-10-25 NOTE — LETTER
October 25, 2018               [facility]  [address]  [place] [address]  Phone: [phone]   Patient: Malachi Weston   MR Number: [id number]   YOB: 2000   Date of Visit: 10/25/2018     To whom it may concern,     Guillermo Weston was seen in the Orthopedic clinic 10/25/2018.  She is able to return to work 11/3/2018 with the following restrictions: no lifting, pushing or pulling over 10lbs, and no bending.  I will see her back 12/21/2018.  We will re-evaluate her work status at that time.     If you have any questions or concerns, please contact the office at [phone].      Sincerely,          Megan Velasco PA-C

## 2018-11-05 ENCOUNTER — TELEPHONE (OUTPATIENT)
Dept: ADMINISTRATIVE | Facility: CLINIC | Age: 18
End: 2018-11-05

## 2018-11-08 ENCOUNTER — TELEPHONE (OUTPATIENT)
Dept: ORTHOPEDICS | Facility: CLINIC | Age: 18
End: 2018-11-08

## 2018-11-08 NOTE — TELEPHONE ENCOUNTER
----- Message from Tana Lucero sent at 11/8/2018  1:00 PM CST -----  Contact: Pt  Name of Who is Calling:MARY CARO [[id number]]    What is the request in detail: Patient states she is in a lot of pain after surgery , she just went back to work  , pain medication does help but she try not to take pain medication Please contact to further discuss and advise    Can the clinic reply by [facility]: No    What Number to Call Back if not in [facility]: [phone]

## 2018-11-08 NOTE — TELEPHONE ENCOUNTER
Returned call to patient but had to leave a message requesting that she call me back so that we may address the pain she is having.

## 2018-12-21 ENCOUNTER — OFFICE VISIT (OUTPATIENT)
Dept: ORTHOPEDICS | Facility: CLINIC | Age: 18
End: 2018-12-21
Payer: MEDICAID

## 2018-12-21 VITALS
HEART RATE: 72 BPM | DIASTOLIC BLOOD PRESSURE: 74 MMHG | WEIGHT: 214.5 LBS | SYSTOLIC BLOOD PRESSURE: 120 MMHG | BODY MASS INDEX: 33.67 KG/M2 | HEIGHT: 67 IN

## 2018-12-21 DIAGNOSIS — Z98.890 S/P DISKECTOMY: Primary | ICD-10-CM

## 2018-12-21 PROCEDURE — 99999 PR PBB SHADOW E&M-EST. PATIENT-LVL III: CPT | Mod: PBBFAC,,, | Performed by: PHYSICIAN ASSISTANT

## 2018-12-21 PROCEDURE — 99213 OFFICE O/P EST LOW 20 MIN: CPT | Mod: PBBFAC | Performed by: PHYSICIAN ASSISTANT

## 2018-12-21 PROCEDURE — 99024 POSTOP FOLLOW-UP VISIT: CPT | Mod: ,,, | Performed by: PHYSICIAN ASSISTANT

## 2018-12-21 RX ORDER — MELOXICAM 15 MG/1
15 TABLET ORAL DAILY
Qty: 30 TABLET | Refills: 0 | Status: SHIPPED | OUTPATIENT
Start: 2018-12-21 | End: 2019-01-20

## 2019-03-06 RX ORDER — CYCLOBENZAPRINE HCL 5 MG
5-10 TABLET ORAL 3 TIMES DAILY PRN
Qty: 60 TABLET | Refills: 0 | Status: SHIPPED | OUTPATIENT
Start: 2019-03-06 | End: 2019-04-23

## 2019-03-25 DIAGNOSIS — M51.36 DDD (DEGENERATIVE DISC DISEASE), LUMBAR: Primary | ICD-10-CM

## 2019-03-26 ENCOUNTER — HOSPITAL ENCOUNTER (OUTPATIENT)
Dept: RADIOLOGY | Facility: HOSPITAL | Age: 19
Discharge: HOME | End: 2019-03-26
Attending: PHYSICIAN ASSISTANT
Payer: MEDICAID

## 2019-03-26 ENCOUNTER — OFFICE VISIT (OUTPATIENT)
Dept: ORTHOPEDICS | Facility: CLINIC | Age: 19
End: 2019-03-26
Payer: MEDICAID

## 2019-03-26 VITALS — BODY MASS INDEX: 33.36 KG/M2 | HEIGHT: 68 IN | WEIGHT: 220.13 LBS

## 2019-03-26 DIAGNOSIS — M54.16 LUMBAR RADICULOPATHY: ICD-10-CM

## 2019-03-26 DIAGNOSIS — Z98.890 S/P DISKECTOMY: Primary | ICD-10-CM

## 2019-03-26 DIAGNOSIS — M51.36 DDD (DEGENERATIVE DISC DISEASE), LUMBAR: ICD-10-CM

## 2019-03-26 PROCEDURE — 72100 XR LUMBAR SPINE AP AND LAT WITH FLEX/EXT: ICD-10-PCS | Mod: 26,,, | Performed by: RADIOLOGY

## 2019-03-26 PROCEDURE — 72120 XR LUMBAR SPINE AP AND LAT WITH FLEX/EXT: ICD-10-PCS | Mod: 26,,, | Performed by: RADIOLOGY

## 2019-03-26 PROCEDURE — 72100 X-RAY EXAM L-S SPINE 2/3 VWS: CPT | Mod: 26,,, | Performed by: RADIOLOGY

## 2019-03-26 PROCEDURE — 99213 OFFICE O/P EST LOW 20 MIN: CPT | Mod: S$PBB,,, | Performed by: PHYSICIAN ASSISTANT

## 2019-03-26 PROCEDURE — 72120 X-RAY BEND ONLY L-S SPINE: CPT | Mod: 26,,, | Performed by: RADIOLOGY

## 2019-03-26 PROCEDURE — 99213 OFFICE O/P EST LOW 20 MIN: CPT | Mod: PBBFAC,25 | Performed by: PHYSICIAN ASSISTANT

## 2019-03-26 PROCEDURE — 99213 PR OFFICE/OUTPT VISIT, EST, LEVL III, 20-29 MIN: ICD-10-PCS | Mod: S$PBB,,, | Performed by: PHYSICIAN ASSISTANT

## 2019-03-26 PROCEDURE — 99999 PR PBB SHADOW E&M-EST. PATIENT-LVL III: CPT | Mod: PBBFAC,,, | Performed by: PHYSICIAN ASSISTANT

## 2019-03-26 PROCEDURE — 72100 X-RAY EXAM L-S SPINE 2/3 VWS: CPT | Mod: TC

## 2019-03-26 PROCEDURE — 99999 PR PBB SHADOW E&M-EST. PATIENT-LVL III: ICD-10-PCS | Mod: PBBFAC,,, | Performed by: PHYSICIAN ASSISTANT

## 2019-03-26 RX ORDER — METHOCARBAMOL 750 MG/1
750 TABLET, FILM COATED ORAL 3 TIMES DAILY
Qty: 60 TABLET | Refills: 0 | Status: SHIPPED | OUTPATIENT
Start: 2019-03-26 | End: 2019-04-15

## 2019-03-26 RX ORDER — METHYLPREDNISOLONE 4 MG/1
TABLET ORAL
Qty: 1 PACKAGE | Refills: 0 | Status: SHIPPED | OUTPATIENT
Start: 2019-03-26 | End: 2019-10-16

## 2019-03-26 NOTE — PROGRESS NOTES
Date: 03/26/2019    Supervising Physician: Anjum Perez M.D.    Date of Surgery: 10/4/2018    Procedure: Left L5/S1 diskectomy    History: Malachi Weston is seen today for follow-up following the above listed procedure. Overall the patient is doing well but today notes she has had worsening pain since I saw her in December.  She started a new job that requires more lifting than she is used to.  She is having worsening back spasms and spasms in both legs, similar to before surgery.  She reports new numbness and tingling in her legs as well.  She has been taking flexeril and mobic with little relief.  She has been doing a HEP prescribed by PT the last time she went to the PT.  She went to the ED 3/9 and they gave her two IM injections that helped some but then the pain returned.        Exam:  Incision is healed.   There is no sign of infection. Neuro exam is stable. No signs of DVT. Patient has new onset numbness in the L5 and S1 dermatomes bilaterally.     Radiographs: imaging today shows no significant abnormalities.     Assessment/Plan:   Given her new and worsening symptoms, I would like to get a new MRI.  I will see her back after the MRI.  Please note she has been doing a HEP prescribed by PT for the last three months, as well as taking flexeril and mobic with little relief.  She has new onset numbness and tingling in the L5 and S1 dermatomes bilaterally.  This has been worsening over the last 3 months.     Thank you for the opportunity to participate in this patient's care. Please give me a call if there are any concerns or questions.

## 2019-04-17 ENCOUNTER — HOSPITAL ENCOUNTER (OUTPATIENT)
Dept: RADIOLOGY | Facility: HOSPITAL | Age: 19
Discharge: HOME | End: 2019-04-17
Attending: PHYSICIAN ASSISTANT
Payer: MEDICAID

## 2019-04-17 DIAGNOSIS — Z98.890 S/P DISKECTOMY: ICD-10-CM

## 2019-04-17 PROCEDURE — A9585 GADOBUTROL INJECTION: HCPCS | Mod: PO | Performed by: PHYSICIAN ASSISTANT

## 2019-04-17 PROCEDURE — 72158 MRI LUMBAR SPINE W/O & W/DYE: CPT | Mod: 26,,, | Performed by: RADIOLOGY

## 2019-04-17 PROCEDURE — 72158 MRI LUMBAR SPINE W/O & W/DYE: CPT | Mod: TC,PO

## 2019-04-17 PROCEDURE — 25500020 PHARM REV CODE 255: Mod: PO | Performed by: PHYSICIAN ASSISTANT

## 2019-04-17 PROCEDURE — 72158 MRI LUMBAR SPINE W WO CONTRAST: ICD-10-PCS | Mod: 26,,, | Performed by: RADIOLOGY

## 2019-04-17 RX ORDER — GADOBUTROL 604.72 MG/ML
10 INJECTION INTRAVENOUS
Status: COMPLETED | OUTPATIENT
Start: 2019-04-17 | End: 2019-04-17

## 2019-04-17 RX ADMIN — GADOBUTROL 10 ML: 604.72 INJECTION INTRAVENOUS at 03:04

## 2019-04-23 ENCOUNTER — OFFICE VISIT (OUTPATIENT)
Dept: ORTHOPEDICS | Facility: CLINIC | Age: 19
End: 2019-04-23
Payer: MEDICAID

## 2019-04-23 VITALS — WEIGHT: 208.31 LBS | HEIGHT: 68 IN | BODY MASS INDEX: 31.57 KG/M2

## 2019-04-23 DIAGNOSIS — Z98.890 S/P DISKECTOMY: Primary | ICD-10-CM

## 2019-04-23 PROCEDURE — 99213 OFFICE O/P EST LOW 20 MIN: CPT | Mod: S$PBB,,, | Performed by: PHYSICIAN ASSISTANT

## 2019-04-23 PROCEDURE — 99999 PR PBB SHADOW E&M-EST. PATIENT-LVL III: CPT | Mod: PBBFAC,,, | Performed by: PHYSICIAN ASSISTANT

## 2019-04-23 PROCEDURE — 99213 PR OFFICE/OUTPT VISIT, EST, LEVL III, 20-29 MIN: ICD-10-PCS | Mod: S$PBB,,, | Performed by: PHYSICIAN ASSISTANT

## 2019-04-23 PROCEDURE — 99999 PR PBB SHADOW E&M-EST. PATIENT-LVL III: ICD-10-PCS | Mod: PBBFAC,,, | Performed by: PHYSICIAN ASSISTANT

## 2019-04-23 PROCEDURE — 99213 OFFICE O/P EST LOW 20 MIN: CPT | Mod: PBBFAC | Performed by: PHYSICIAN ASSISTANT

## 2019-04-23 RX ORDER — CYCLOBENZAPRINE HCL 10 MG
10 TABLET ORAL 3 TIMES DAILY PRN
Qty: 60 TABLET | Refills: 0 | Status: SHIPPED | OUTPATIENT
Start: 2019-04-23 | End: 2019-05-03

## 2019-04-23 NOTE — PROGRESS NOTES
Date: 04/23/2019    Supervising Physician: Anjum Perez M.D.    Date of Surgery: 10/4/2018    Procedure: Left L5/S1 diskectomy    History: Malachi Weston is seen today for follow-up following the above listed procedure and for MRI results.  When I saw her 4/12 she complained of worsening spasms in her legs over the last 3-4 months.  She says the pain feels like spasms, not like nerve pain.  She has been taking flexeril occasionally which seems to help but she hasn't been taking it regularly.  She has a lot of increased stress lately.         Exam:  Incision is healed.   There is no sign of infection. Neuro exam is stable. No signs of DVT.     Radiographs: MRI shows no new disc herniations.  No spinal canal or neural foraminal narrowing.        Assessment/Plan:   Discussed with Dr. Perze.  I have recommended flexeril and magnesium for the spasms.  I have also recommended she see a counselor to help her cope with the increased stress in her life.  I gave her 2 names to look into.  Follow up as needed.     Thank you for the opportunity to participate in this patient's care. Please give me a call if there are any concerns or questions.

## 2019-09-21 ENCOUNTER — PATIENT MESSAGE (OUTPATIENT)
Dept: ORTHOPEDICS | Facility: CLINIC | Age: 19
End: 2019-09-21

## 2019-10-03 ENCOUNTER — PATIENT MESSAGE (OUTPATIENT)
Dept: ORTHOPEDICS | Facility: CLINIC | Age: 19
End: 2019-10-03

## 2019-10-03 ENCOUNTER — TELEPHONE (OUTPATIENT)
Dept: ORTHOPEDICS | Facility: CLINIC | Age: 19
End: 2019-10-03

## 2019-10-03 DIAGNOSIS — M51.36 DDD (DEGENERATIVE DISC DISEASE), LUMBAR: Primary | ICD-10-CM

## 2019-10-08 ENCOUNTER — HOSPITAL ENCOUNTER (OUTPATIENT)
Dept: RADIOLOGY | Facility: HOSPITAL | Age: 19
Discharge: HOME | End: 2019-10-08
Attending: PHYSICIAN ASSISTANT
Payer: MEDICAID

## 2019-10-08 ENCOUNTER — OFFICE VISIT (OUTPATIENT)
Dept: ORTHOPEDICS | Facility: CLINIC | Age: 19
End: 2019-10-08
Payer: MEDICAID

## 2019-10-08 VITALS — WEIGHT: 205.25 LBS | HEIGHT: 68 IN | BODY MASS INDEX: 31.11 KG/M2

## 2019-10-08 DIAGNOSIS — M89.8X1 PERISCAPULAR PAIN: Primary | ICD-10-CM

## 2019-10-08 DIAGNOSIS — G89.29 CHRONIC LOW BACK PAIN WITHOUT SCIATICA, UNSPECIFIED BACK PAIN LATERALITY: ICD-10-CM

## 2019-10-08 DIAGNOSIS — M51.36 DDD (DEGENERATIVE DISC DISEASE), LUMBAR: ICD-10-CM

## 2019-10-08 DIAGNOSIS — M54.50 CHRONIC LOW BACK PAIN WITHOUT SCIATICA, UNSPECIFIED BACK PAIN LATERALITY: ICD-10-CM

## 2019-10-08 DIAGNOSIS — Z98.890 S/P DISKECTOMY: ICD-10-CM

## 2019-10-08 PROCEDURE — 99999 PR PBB SHADOW E&M-EST. PATIENT-LVL III: CPT | Mod: PBBFAC,,, | Performed by: PHYSICIAN ASSISTANT

## 2019-10-08 PROCEDURE — 99214 PR OFFICE/OUTPT VISIT, EST, LEVL IV, 30-39 MIN: ICD-10-PCS | Mod: S$PBB,,, | Performed by: PHYSICIAN ASSISTANT

## 2019-10-08 PROCEDURE — 99999 PR PBB SHADOW E&M-EST. PATIENT-LVL III: ICD-10-PCS | Mod: PBBFAC,,, | Performed by: PHYSICIAN ASSISTANT

## 2019-10-08 PROCEDURE — 99214 OFFICE O/P EST MOD 30 MIN: CPT | Mod: S$PBB,,, | Performed by: PHYSICIAN ASSISTANT

## 2019-10-08 PROCEDURE — 99213 OFFICE O/P EST LOW 20 MIN: CPT | Mod: PBBFAC,25 | Performed by: PHYSICIAN ASSISTANT

## 2019-10-08 PROCEDURE — 72100 X-RAY EXAM L-S SPINE 2/3 VWS: CPT | Mod: 26,,, | Performed by: RADIOLOGY

## 2019-10-08 PROCEDURE — 72120 X-RAY BEND ONLY L-S SPINE: CPT | Mod: 26,,, | Performed by: RADIOLOGY

## 2019-10-08 PROCEDURE — 72100 X-RAY EXAM L-S SPINE 2/3 VWS: CPT | Mod: TC

## 2019-10-08 PROCEDURE — 72100 XR LUMBAR SPINE AP AND LAT WITH FLEX/EXT: ICD-10-PCS | Mod: 26,,, | Performed by: RADIOLOGY

## 2019-10-08 PROCEDURE — 72120 XR LUMBAR SPINE AP AND LAT WITH FLEX/EXT: ICD-10-PCS | Mod: 26,,, | Performed by: RADIOLOGY

## 2019-10-08 RX ORDER — DIAZEPAM 2 MG/1
2 TABLET ORAL EVERY 8 HOURS PRN
Qty: 21 TABLET | Refills: 0 | Status: SHIPPED | OUTPATIENT
Start: 2019-10-08 | End: 2019-10-16

## 2019-10-08 NOTE — PROGRESS NOTES
Date: 10/08/2019    Supervising Physician: Anjum Perez M.D.    Date of Surgery: 10/4/2018    Procedure: Left L5/S1 diskectomy    History: Malachi Weston is seen today for follow-up following the above listed procedure and for evaluation of worsening back pain.  She continues to have low back pain.  She also reports neck and left periscapular pain.  This has worsened over the last 3 weeks.  She tripped and fell down her stairs because she had a sharp pain in her back.  There is no leg pain.  There is no numbness or tingling.  There is subjective weakness.  She has tried flexeril, tigerbalm, salonspas, lidocaine patches, and naproxen with little relief.  The pain is keeping her out of work.  She has been to the ED several times int he last few months for the same pain.  She is miserable.         Exam:  Incision is healed.   There is no sign of infection. Neuro exam is stable. No signs of DVT.  There is mild lumbar and left periscapular tenderness to palpation.  Positive straight leg raise bilaterally, reproduces back pain.     Radiographs: AP, lat and flex/ex upright L spine films demonstrate no evidence of instability.        Assessment/Plan:   Discussed with Dr. Perez.  I would like to get an MRI lumbar spine and an MRI cervical spine.  I will also get cervical films.  I will see her back after the MRIs to discuss results and further treatment.  I have sent a week supply of valium for her to try as well.  I will not continue to refill this.     Thank you for the opportunity to participate in this patient's care. Please give me a call if there are any concerns or questions.

## 2019-10-08 NOTE — LETTER
October 8, 2019                   [facility]  [address]  [place] [address]  Phone: [phone]   Patient: Malachi Weston   MR Number: [id number]   YOB: 2000   Date of Visit: 10/8/2019     To whom it may concern,    Guillermo Weston was seen in the Orthopedic clinic 10/8/2019.  She is able to return to work 10/9/2019 with the following restrictions: no lifting, pushing or pulling over 20 lbs.    If you have any questions or concerns, please contact the office at [phone].        Sincerely,          Megan Velasco PA-C

## 2019-10-15 ENCOUNTER — HOSPITAL ENCOUNTER (OUTPATIENT)
Dept: RADIOLOGY | Facility: HOSPITAL | Age: 19
Discharge: HOME | End: 2019-10-15
Attending: PHYSICIAN ASSISTANT
Payer: MEDICAID

## 2019-10-15 DIAGNOSIS — G89.29 CHRONIC LOW BACK PAIN WITHOUT SCIATICA, UNSPECIFIED BACK PAIN LATERALITY: ICD-10-CM

## 2019-10-15 DIAGNOSIS — Z98.890 S/P DISKECTOMY: ICD-10-CM

## 2019-10-15 DIAGNOSIS — M54.50 CHRONIC LOW BACK PAIN WITHOUT SCIATICA, UNSPECIFIED BACK PAIN LATERALITY: ICD-10-CM

## 2019-10-15 DIAGNOSIS — M89.8X1 PERISCAPULAR PAIN: ICD-10-CM

## 2019-10-15 PROCEDURE — 72050 XR CERVICAL SPINE AP LAT WITH FLEX EXTEN: ICD-10-PCS | Mod: 26,,, | Performed by: RADIOLOGY

## 2019-10-15 PROCEDURE — 72141 MRI CERVICAL SPINE WITHOUT CONTRAST: ICD-10-PCS | Mod: 26,,, | Performed by: RADIOLOGY

## 2019-10-15 PROCEDURE — A9585 GADOBUTROL INJECTION: HCPCS | Performed by: PHYSICIAN ASSISTANT

## 2019-10-15 PROCEDURE — 72158 MRI LUMBAR SPINE W WO CONTRAST: ICD-10-PCS | Mod: 26,,, | Performed by: RADIOLOGY

## 2019-10-15 PROCEDURE — 25500020 PHARM REV CODE 255: Performed by: PHYSICIAN ASSISTANT

## 2019-10-15 PROCEDURE — 72050 X-RAY EXAM NECK SPINE 4/5VWS: CPT | Mod: TC

## 2019-10-15 PROCEDURE — 72141 MRI NECK SPINE W/O DYE: CPT | Mod: 26,,, | Performed by: RADIOLOGY

## 2019-10-15 PROCEDURE — 72050 X-RAY EXAM NECK SPINE 4/5VWS: CPT | Mod: 26,,, | Performed by: RADIOLOGY

## 2019-10-15 PROCEDURE — 72141 MRI NECK SPINE W/O DYE: CPT | Mod: TC

## 2019-10-15 PROCEDURE — 72158 MRI LUMBAR SPINE W/O & W/DYE: CPT | Mod: 26,,, | Performed by: RADIOLOGY

## 2019-10-15 PROCEDURE — 72158 MRI LUMBAR SPINE W/O & W/DYE: CPT | Mod: TC

## 2019-10-15 RX ORDER — GADOBUTROL 604.72 MG/ML
10 INJECTION INTRAVENOUS
Status: COMPLETED | OUTPATIENT
Start: 2019-10-15 | End: 2019-10-15

## 2019-10-15 RX ADMIN — GADOBUTROL 10 ML: 604.72 INJECTION INTRAVENOUS at 03:10

## 2019-10-16 ENCOUNTER — TELEPHONE (OUTPATIENT)
Dept: ORTHOPEDICS | Facility: CLINIC | Age: 19
End: 2019-10-16

## 2019-10-16 ENCOUNTER — OFFICE VISIT (OUTPATIENT)
Dept: ORTHOPEDICS | Facility: CLINIC | Age: 19
End: 2019-10-16
Payer: MEDICAID

## 2019-10-16 VITALS — WEIGHT: 205 LBS | BODY MASS INDEX: 31.07 KG/M2 | HEIGHT: 68 IN

## 2019-10-16 DIAGNOSIS — M54.16 LUMBAR RADICULOPATHY: ICD-10-CM

## 2019-10-16 DIAGNOSIS — Z98.890 S/P DISKECTOMY: Primary | ICD-10-CM

## 2019-10-16 DIAGNOSIS — M89.8X1 PERISCAPULAR PAIN: ICD-10-CM

## 2019-10-16 DIAGNOSIS — F41.9 ANXIETY: ICD-10-CM

## 2019-10-16 PROCEDURE — 99999 PR PBB SHADOW E&M-EST. PATIENT-LVL IV: ICD-10-PCS | Mod: PBBFAC,,, | Performed by: PHYSICIAN ASSISTANT

## 2019-10-16 PROCEDURE — 99214 OFFICE O/P EST MOD 30 MIN: CPT | Mod: PBBFAC | Performed by: PHYSICIAN ASSISTANT

## 2019-10-16 PROCEDURE — 99213 PR OFFICE/OUTPT VISIT, EST, LEVL III, 20-29 MIN: ICD-10-PCS | Mod: S$PBB,,, | Performed by: PHYSICIAN ASSISTANT

## 2019-10-16 PROCEDURE — 99213 OFFICE O/P EST LOW 20 MIN: CPT | Mod: S$PBB,,, | Performed by: PHYSICIAN ASSISTANT

## 2019-10-16 PROCEDURE — 99999 PR PBB SHADOW E&M-EST. PATIENT-LVL IV: CPT | Mod: PBBFAC,,, | Performed by: PHYSICIAN ASSISTANT

## 2019-10-16 RX ORDER — TIZANIDINE 4 MG/1
4 TABLET ORAL EVERY 6 HOURS PRN
Qty: 60 TABLET | Refills: 0 | Status: SHIPPED | OUTPATIENT
Start: 2019-10-16

## 2019-10-16 NOTE — PROGRESS NOTES
Date: 10/16/2019    Supervising Physician: Anjum Perez M.D.    Date of Surgery: 10/4/2018    Procedure: Left L5/S1 diskectomy    History: Malachi Weston is seen today for MRI results.  She continues to have low back pain.  She also reports neck and left periscapular pain.  This has worsened over the last 3-4 weeks.  She tripped and fell down her stairs because she had a sharp pain in her back.  She is having some left leg pain today.  There is no numbness or tingling.  There is subjective weakness.  She has tried flexeril, tigerbalm, salonspas, lidocaine patches, and naproxen with little relief.  The pain is keeping her out of work.  She has been to the ED several times int he last few months for the same pain.  She is miserable.  We tried valium and it did help her pain some, however I told her I do not want to continue prescribing this.  She usually takes klonopin and wellbutrin for anxiety but has not taken those since February because she no longer has a psychiatrist.  She is trying to find a new one.         Exam:  Incision is healed.   There is no sign of infection. Neuro exam is stable. No signs of DVT.      Radiographs: MRI cervical and lumbar spine was personally reviewed by Dr. Perez and me.  There is no significant cervical stenosis or neural foraminal narrowing.  There are operative changes s/p L5/S1 diskectomy and left hemilaminectomy.  There is no evidence of recurrent herniation.        Assessment/Plan:   Discussed with Dr. Perez.  There is no significant evidence on MRI to explain the patient's symptoms.  We would like to get an EMG.  I will see her back after the EMG.  We will also try to get her in with psych here to establish care.  I have also put in orders for physical therapy.      Thank you for the opportunity to participate in this patient's care. Please give me a call if there are any concerns or questions.

## 2019-10-18 ENCOUNTER — PROCEDURE VISIT (OUTPATIENT)
Dept: NEUROLOGY | Facility: CLINIC | Age: 19
End: 2019-10-18
Payer: MEDICAID

## 2019-10-18 DIAGNOSIS — M54.16 LUMBAR RADICULOPATHY: ICD-10-CM

## 2019-10-18 DIAGNOSIS — Z98.890 S/P DISKECTOMY: ICD-10-CM

## 2019-10-18 PROCEDURE — 95886 PR EMG COMPLETE, W/ NERVE CONDUCTION STUDIES, 5+ MUSCLES: ICD-10-PCS | Mod: 26,S$PBB,, | Performed by: NEUROLOGICAL SURGERY

## 2019-10-18 PROCEDURE — 95911 NRV CNDJ TEST 9-10 STUDIES: CPT | Mod: PBBFAC | Performed by: NEUROLOGICAL SURGERY

## 2019-10-18 PROCEDURE — 95911 NRV CNDJ TEST 9-10 STUDIES: CPT | Mod: 26,S$PBB,, | Performed by: NEUROLOGICAL SURGERY

## 2019-10-18 PROCEDURE — 95911 PR NERVE CONDUCTION STUDY; 9-10 STUDIES: ICD-10-PCS | Mod: 26,S$PBB,, | Performed by: NEUROLOGICAL SURGERY

## 2019-10-18 PROCEDURE — 99203 PR OFFICE/OUTPT VISIT, NEW, LEVL III, 30-44 MIN: ICD-10-PCS | Mod: S$PBB,,, | Performed by: NEUROLOGICAL SURGERY

## 2019-10-18 PROCEDURE — 95886 MUSC TEST DONE W/N TEST COMP: CPT | Mod: 26,S$PBB,, | Performed by: NEUROLOGICAL SURGERY

## 2019-10-18 PROCEDURE — 99203 OFFICE O/P NEW LOW 30 MIN: CPT | Mod: S$PBB,,, | Performed by: NEUROLOGICAL SURGERY

## 2019-10-18 PROCEDURE — 95886 MUSC TEST DONE W/N TEST COMP: CPT | Mod: PBBFAC | Performed by: NEUROLOGICAL SURGERY

## 2019-10-20 VITALS — HEIGHT: 68 IN | BODY MASS INDEX: 31.07 KG/M2 | WEIGHT: 205 LBS | TEMPERATURE: 87 F

## 2019-10-21 ENCOUNTER — CLINICAL SUPPORT (OUTPATIENT)
Dept: REHABILITATION | Facility: HOSPITAL | Age: 19
End: 2019-10-21
Attending: PHYSICIAN ASSISTANT
Payer: MEDICAID

## 2019-10-21 DIAGNOSIS — G89.29 CHRONIC BILATERAL LOW BACK PAIN WITHOUT SCIATICA: ICD-10-CM

## 2019-10-21 DIAGNOSIS — M54.50 CHRONIC BILATERAL LOW BACK PAIN WITHOUT SCIATICA: ICD-10-CM

## 2019-10-21 PROCEDURE — 97162 PT EVAL MOD COMPLEX 30 MIN: CPT | Mod: PO | Performed by: PHYSICAL THERAPIST

## 2019-10-21 PROCEDURE — 97110 THERAPEUTIC EXERCISES: CPT | Mod: PO | Performed by: PHYSICAL THERAPIST

## 2019-10-21 NOTE — PLAN OF CARE
"[facility]  Physical Therapy Initial Evaluation    Name: Malachi Weston  Clinic Number: [id number]    Therapy Diagnosis:   Encounter Diagnosis   Name Primary?    Chronic bilateral low back pain without sciatica      Physician: Megan Velasco PA-C    Physician Orders: PT Eval and Treat; Lumbar protocol with home exercises. ROM, stretching lumbar and abdominal musculature. Aerobic condidtioning, aqua therapy , with modalities including ultrasound, massage, dry needling PRN. 3 X week for 6 weeks.  Medical Diagnosis from Referral: S/P diskectomy  Periscapular pain  Lumbar radiculopathy  Evaluation Date: 10/21/2019  Authorization Period Expiration: 11/30/2019  Plan of Care Expiration: 12/02/2019  Visit # / Visits authorized: 1/ 1    Time In: 8:05 AM  Time Out: 9:00 AM   Total Billable Time: 55 minutes    Precautions: Standard    Subjective   Date of onset: August 2019   History of current condition - Guillermo reports: Having a lumbar discectomy in October of 2018. She reports that her pain has increased since the surgery and is constant. Her pain is worsened with activity and prolonged static sitting. She denies any numbness or tingling down her legs or saddle anesthesia. She does report having "sharp pains that shoot down her leg causing her legs to give out." She has been to the ER multiple times due to the pain. She has recently had an EMG study and states that one of her nerves is "dead." She does report feeling like she has a "full bladder, but not being able to fully empty it."       Past Medical History:   Diagnosis Date    ADHD (attention deficit hyperactivity disorder)     Anxiety     Broken leg 2009    right     Constipation - functional     Depression     Depression     Dyslexia     Headache(784.0)     Herniated disc     Hypotension arterial     Nerve damage     right leg    Panic disorder      Malachi Weston  has a past surgical history that includes Ankle " "fracture surgery; Tibia fracture surgery (2012); Mouth surgery; and Lumbar laminectomy with discectomy (Left, 10/4/2018).    Malachi has a current medication list which includes the following prescription(s): cetirizine, desog-e.estradiol/e.estradiol, hydroxyzine pamoate, lidocaine, ponstel, naproxen, ondansetron, and tizanidine.    Review of patient's allergies indicates:   Allergen Reactions    Perfume ht52 Anaphylaxis        Imaging, MRI studies, x-rays: Operative changes of L5/S1 discectomy and left L5 hemilaminectomy.  No evidence of a recurrent or residual disc extrusion.    1.2 cm rim enhancing focus in the left post laminectomy site at L5 which could reflect developing abscess or resolving postoperative hematoma.  Short-term follow-up recommended.    Minimal lumbar spondylosis, as detailed above.  No neural foraminal narrowing or spinal canal stenosis.    Prior Therapy: Prior to back surgery; possibly after back surgery  Social History:  lives alone  Occupation:  and ; not currently working   Prior Level of Function: Was a college student, working and performing all ADL activities prior to injury and corresponding surgery   Current Level of Function: Difficulty lifting, reduced tolerance to prolonged sitting, currently unable to work     Pain:  Current 5/10, worst 10/10, best 5/10   Location: bilateral and  back  and scapular region   Description: Tight, Sharp and "muscular"   Aggravating Factors: Sitting, Standing, Bending, Lifting and reaching   Easing Factors: pain medication    Pts goals: Return to [employer] 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    Lumbar ROM:   AROM  Comment   Flexion: 100%  *scapuar pain    Extension: 100%     Lat Flex R: 100%     Lat Flex L: 100%  " "*scapular pain    Rotation R: 100%     Rotation L: 100%     *pain    Cervical ROM: Full in all planes; no pain but some "tension"    Strength: Dermatomes:   Right Left Comment   L2 intact intact    L3 intact intact    L4 intact intact    L5 intact intact    S1 intact intact    S2 intact intact    Saddle intact intact      Myotomes:   Right Left Comment   Hip flexion (L2-3): 5/5 5/5    Knee extension (L3-4): 5/5 5/5    DF (L4-5): 5/5 5/5    Great Toe Ext (L5-S1): 5/5 5/5    Great Toe Flex (S1-S2): 5/5 5/5      Able to heel/toe walk without difficulty   Hip abductors: 5/5 bilaterally  Hip extension: 5/5 bilaterally     DTR:   Right Left Comment   Patellar (L3-4) 2+ 2+    Achilles (S1) 2+ 2+        Special Tests:  Repeated ROM ROM (% of normal) Pain? Radiation?   Flex Stand: 100% After 2 x 10 increase low back pain, produced bilateral scapular pain  N   Ext Stand: 100% Mild increase, NW    Flex Supine: NT     Ext Prone: 100% NE       Functional Tests (* indicates w/ pain)   SLR: (+) on left     Palpation:  Increased tone in B UT, LS, right piriformis  TTP at Right L5 segment     Pt/family was provided educational information, including: role of PT, goals for PT, scheduling - pt verbalized understanding. Discussed insurance plan with pt.     CMS Impairment/Limitation/Restriction for FOTO Lumbar  Survey    Therapist reviewed FOTO scores for Malachi Weston on 10/21/2019.   FOTO documents entered into [employer] - see Media section.    Limitation Score: 63%  Category: Mobility         TREATMENT   Treatment Time In: 8:45 AM   Treatment Time Out: 9:00 AM   Total Treatment time separate from Evaluation: 15 minutes    Guillermo received therapeutic exercises to develop ROM and posture for 15 minutes including:  HEP setup and instruction; handout issued   Prone press ups 2 x 10   Cervical retractions 10x     Home Exercises and Patient Education Provided    Education provided:   - Indications/contraindication and benefits of dry " needling   - POC  - HEP     Written Home Exercises Provided: yes.  Exercises were reviewed and Guillermo was able to demonstrate them prior to the end of the session.  Guillermo demonstrated good  understanding of the education provided.     See EMR under Patient Instructions for exercises provided 10/21/2019.      Assessment   Pt presents with a medical diagnosis of Lumbar radiculopathy. She has chronic lumbar pain that is constant in nature and cervical soft tissue dysfunction. She hasfull lumbar and cervical ROM and good strength. Due to her chronic pain, she has difficulty performing ADL activities and is unable to work. She will benefit from physical therapy treatment to assist in reducing pain levels and promote a return to work.     Pt prognosis is Fair.   Pt will benefit from skilled outpatient Physical Therapy to address the deficits stated above and in the chart below, provide pt/family education, and to maximize pt's level of independence.     Plan of care discussed with patient: Yes  Pt's spiritual, cultural and educational needs considered and patient is agreeable to the plan of care and goals as stated below:     Anticipated Barriers for therapy: None at this time     Medical Necessity is demonstrated by the following  History  Co-morbidities and personal factors that may impact the plan of care Co-morbidities:   anxiety, depression, high BMI, prior lumbar surgery and orthostatic hypotension    Personal Factors:   lifestyle     high   Examination  Body Structures and Functions, activity limitations and participation restrictions that may impact the plan of care Body Regions:   neck  back  trunk    Body Systems:    motor control  posture     Participation Restrictions:       Activity limitations:   Learning and applying knowledge  no deficits    General Tasks and Commands  no deficits    Communication  no deficits    Mobility  lifting and carrying objects  walking    Self care  no deficits    Domestic  Life  shopping  cooking  doing house work (cleaning house, washing dishes, laundry)    Interactions/Relationships  no deficits    Life Areas  employment    Community and Social Life  community life  recreation and leisure         high   Clinical Presentation evolving clinical presentation with changing clinical characteristics moderate   Decision Making/ Complexity Score: moderate     Goals:  Short Term Goals: 3 weeks   1) Pt. Will be I with established HEP   2) Pt will sit with improved postural awareness to assist in reducing pain  3) Pt will have pain no worse then 8/10 during ADL activities     Long Term Goals: 6 weeks   1) Pt will lift 50# with appropriate lifting mechanics and lumbar pain no worse then 3/10   2) Pt. wlll perform all ADL activities with pain no worse then 3/10  3) Pt. Will have 20% or less limitations on the FOTO        Plan   Plan of care Certification: 10/21/2019 to 12/2/2019.    Outpatient Physical Therapy 2 times weekly for 6 weeks to include the following interventions: Manual Therapy, Neuromuscular Re-ed, Patient Education, Therapeutic Activites, Therapeutic Exercise and dry needling .     Jesse Malloy, PT

## 2019-10-21 NOTE — PROCEDURES
Chief Complaint   Patient presents with    Other Misc     EMG        Malachi Weston is a 19 y.o. female with a history of multiple medical diagnoses as listed below that presents for evaluation of low back pain. She has been referred by Megan Velasco PA-C for EMG/NCS to assess her symptoms. She has had L5/S1 diskectomy and still has pain in the leg since the procedure. There has been no response to topical treatments, muscle relaxers, or NSAIDs. She has been to the ED on several occasions and has been missing days of work from the pain. She has felt limited in her ability to care for her ADLs as well. She has a history of an anxiety disorder but has not been able to establish care with a Psychiatrist. She has been referred to physical therapy.    PAST MEDICAL HISTORY:  Past Medical History:   Diagnosis Date    ADHD (attention deficit hyperactivity disorder)     Anxiety     Broken leg 2009    right     Constipation - functional     Depression     Depression     Dyslexia     Headache(784.0)     Herniated disc     Hypotension arterial     Nerve damage     right leg    Panic disorder        PAST SURGICAL HISTORY:  Past Surgical History:   Procedure Laterality Date    ANKLE FRACTURE SURGERY      LUMBAR LAMINECTOMY WITH DISCECTOMY Left 10/4/2018    Procedure: LAMINECTOMY, SPINE, LUMBAR, WITH DISCECTOMY Left L5/S1 SNS: SSEP/EMG [place] + Pads **Homa Retractor?**;  Surgeon: Anjum Perez MD;  Location: [facility] OR [address] [place] [address];  Service: Neurosurgery;  Laterality: Left;    MOUTH SURGERY      TIBIA FRACTURE SURGERY  2012    screw removal       SOCIAL HISTORY:  Social History     Socioeconomic History    Marital status: Single     Spouse name: Not on file    Number of children: Not on file    Years of education: Not on file    Highest education level: Not on file   Occupational History    Not on file   Social Needs    Financial resource strain: Not on file    Food insecurity:     Worry: Not  on file     Inability: Not on file    Transportation needs:     Medical: Not on file     Non-medical: Not on file   Tobacco Use    Smoking status: Passive Smoke Exposure - Never Smoker    Smokeless tobacco: Never Used    Tobacco comment: uncle and aunt smoke outside   Substance and Sexual Activity    Alcohol use: Yes     Comment: sips of wine    Drug use: No    Sexual activity: Never     Comment: States has never been sexually active   Lifestyle    Physical activity:     Days per week: Not on file     Minutes per session: Not on file    Stress: Not on file   Relationships    Social connections:     Talks on phone: Not on file     Gets together: Not on file     Attends Orthodoxy service: Not on file     Active member of club or organization: Not on file     Attends meetings of clubs or organizations: Not on file     Relationship status: Not on file   Other Topics Concern    Not on file   Social History Narrative    12th grade at [employer]. Lives at home with grandmother,grandfather, brother and cousin. 3 dogs, fish. Grandmother and grandfather have had custody since birth.       FAMILY HISTORY:  Family History   Problem Relation Age of Onset    Hyperlipidemia Maternal Grandfather     Breast cancer Maternal Aunt     Hypertension Maternal Grandmother     Breast cancer Maternal Grandmother     Hyperlipidemia Maternal Grandmother     Heart disease Maternal Grandmother     Colon cancer Other     Colon cancer Other     Drug abuse Mother     Ovarian cancer Neg Hx     Diabetes Neg Hx     Congenital heart disease Neg Hx     Sudden death Neg Hx         no SCD <51 y/o       ALLERGIES AND MEDICATIONS: updated and reviewed.  Review of patient's allergies indicates:   Allergen Reactions    Perfume ht52 Anaphylaxis     Current Outpatient Medications   Medication Sig Dispense Refill    cetirizine (ZYRTEC) 10 MG tablet TAKE 1 TABLET BY MOUTH ONCE EVERY MORNING  6    desogestrel-ethinyl  estradiol (KARIVA, 28,) 0.15-0.02mg x21 /0.01 mg x 5 per tablet Take 1 tablet by mouth once daily. 28 tablet 12    hydrOXYzine pamoate (VISTARIL) 25 MG Cap       lidocaine (LIDODERM) 5 % Place 1 patch onto the skin once daily. Remove & Discard patch within 12 hours or as directed by MD 15 patch 0    mefenamic acid (PONSTEL) 250 mg Cap Take 250 mg by mouth 3 (three) times daily as needed.      naproxen (NAPROSYN) 500 MG tablet Take 1 tablet (500 mg total) by mouth 2 (two) times daily with meals. 10 tablet 0    ondansetron (ZOFRAN) 8 MG tablet Take 1 tablet (8 mg total) by mouth every 12 (twelve) hours as needed for Nausea. 60 tablet 0    tiZANidine (ZANAFLEX) 4 MG tablet Take 1 tablet (4 mg total) by mouth every 6 (six) hours as needed. 60 tablet 0     No current facility-administered medications for this visit.        Review of Systems   Constitutional: Negative for activity change, appetite change, fever and unexpected weight change.   HENT: Negative for trouble swallowing and voice change.    Eyes: Negative for photophobia and visual disturbance.   Respiratory: Negative for apnea and shortness of breath.    Cardiovascular: Negative for chest pain and leg swelling.   Gastrointestinal: Negative for constipation and nausea.   Genitourinary: Negative for difficulty urinating.   Musculoskeletal: Positive for back pain and gait problem. Negative for neck pain.   Skin: Negative for color change and pallor.   Neurological: Positive for weakness and numbness. Negative for dizziness, seizures and syncope.   Hematological: Negative for adenopathy.   Psychiatric/Behavioral: Negative for agitation, confusion and decreased concentration.       Neurologic Exam     Mental Status   Oriented to person, place, and time.   Registration: recalls 3 of 3 objects.   Attention: normal. Concentration: normal.   Speech: speech is normal   Level of consciousness: alert  Knowledge: good.     Cranial Nerves     CN II   Visual fields full  to confrontation.   Right visual field deficit: none  Left visual field deficit: none     CN III, IV, VI   Pupils are equal, round, and reactive to light.  Extraocular motions are normal.   Right pupil: Size: 3 mm. Shape: regular. Accommodation: intact.   Left pupil: Size: 3 mm. Shape: regular. Accommodation: intact.   CN III: no CN III palsy  CN VI: no CN VI palsy  Nystagmus: none   Diplopia: none  Ophthalmoparesis: none  Upgaze: normal  Downgaze: normal  Conjugate gaze: present    CN V   Facial sensation intact.   Right facial sensation deficit: none  Left facial sensation deficit: none    CN VII   Facial expression full, symmetric.   Right facial weakness: none  Left facial weakness: none    CN VIII   CN VIII normal.     CN IX, X   CN IX normal.   CN X normal.   Palate: symmetric    CN XI   CN XI normal.   Right sternocleidomastoid strength: normal  Left sternocleidomastoid strength: normal  Right trapezius strength: normal  Left trapezius strength: normal    CN XII   CN XII normal.   Tongue deviation: none    Motor Exam   Muscle bulk: normal  Overall muscle tone: normal  Right arm tone: normal  Left arm tone: normal  Right leg tone: normal  Left leg tone: normal    Strength   Strength 5/5 throughout.     Sensory Exam   Right arm light touch: normal  Left arm light touch: normal  Right leg light touch: normal  Left leg light touch: normal  Right arm vibration: normal  Left arm vibration: normal  Right leg vibration: normal  Left leg vibration: normal  Right arm proprioception: normal  Left arm proprioception: normal  Right leg proprioception: normal  Left leg proprioception: normal  Right arm pinprick: normal  Left arm pinprick: normal  Right leg pinprick: normal  Left leg pinprick: normal    Gait, Coordination, and Reflexes     Gait  Gait: normal    Coordination   Romberg: negative  Finger to nose coordination: normal  Heel to shin coordination: normal  Tandem walking coordination: normal    Tremor   Resting  "tremor: absent    Reflexes   Right brachioradialis: 2+  Left brachioradialis: 2+  Right biceps: 2+  Left biceps: 2+  Right triceps: 2+  Left triceps: 2+  Right patellar: 2+  Left patellar: 2+  Right achilles: 2+  Left achilles: 2+  Right plantar: normal  Left plantar: normal      Physical Exam   Constitutional: She is oriented to person, place, and time. She appears well-developed and well-nourished.   HENT:   Head: Normocephalic and atraumatic.   Eyes: Pupils are equal, round, and reactive to light. EOM are normal.   Neck: Normal range of motion.   Cardiovascular: Normal rate and intact distal pulses.   Pulmonary/Chest: Effort normal. No apnea. No respiratory distress.   Musculoskeletal: Normal range of motion.   Neurological: She is alert and oriented to person, place, and time. She has normal strength. She has a normal Finger-Nose-Finger Test, a normal Heel to Shin Test, a normal Romberg Test and a normal Tandem Gait Test. Gait normal.   Reflex Scores:       Tricep reflexes are 2+ on the right side and 2+ on the left side.       Bicep reflexes are 2+ on the right side and 2+ on the left side.       Brachioradialis reflexes are 2+ on the right side and 2+ on the left side.       Patellar reflexes are 2+ on the right side and 2+ on the left side.       Achilles reflexes are 2+ on the right side and 2+ on the left side.  Skin: Skin is warm and dry.   Psychiatric: She has a normal mood and affect. Her speech is normal and behavior is normal. Thought content normal.   Vitals reviewed.      Vitals:    10/18/19 1207   Temp: (!) 87.4 °F (30.8 °C)   TempSrc: Skin   Weight: 93 kg (205 lb 0.4 oz)   Height: 5' 8" (1.727 m)       Assessment & Plan:    Problem List Items Addressed This Visit     Lumbar radiculopathy      Other Visit Diagnoses     S/P diskectomy            EMG shows peroneal neuropathy. Full report available to Megan Velasco PA-C, in the media section of the chart.    Follow-up: Follow up if symptoms worsen " or fail to improve.    This note was done with the assistance of voice recognition software. Some errors may be present after proofreading.

## 2019-10-21 NOTE — PATIENT INSTRUCTIONS
Flexibility: Neck Retraction        Pull head straight back, keeping eyes and jaw level.  Repeat __10__ times per set. Do __1__ sets per session. Do __4-5__ sessions per day.     [email or web address]://[email or web address] Quote [email or web address]     Copyright © [employer]. All rights reserved.   Press-Up        Press upper body upward, keeping hips in contact with floor. Keep lower back and buttocks relaxed. Hold ____ seconds.  Repeat __10__ times per set. Do _1___ sets per session. Do __4-5__ sessions per day.     [email or web address] Quote Save/94     Copyright © [employer]. All rights reserved.

## 2019-10-29 ENCOUNTER — TELEPHONE (OUTPATIENT)
Dept: ORTHOPEDICS | Facility: CLINIC | Age: 19
End: 2019-10-29

## 2019-10-29 ENCOUNTER — PATIENT MESSAGE (OUTPATIENT)
Dept: ORTHOPEDICS | Facility: CLINIC | Age: 19
End: 2019-10-29

## 2019-10-29 ENCOUNTER — CLINICAL SUPPORT (OUTPATIENT)
Dept: REHABILITATION | Facility: HOSPITAL | Age: 19
End: 2019-10-29
Attending: PHYSICIAN ASSISTANT
Payer: MEDICAID

## 2019-10-29 DIAGNOSIS — M51.36 DDD (DEGENERATIVE DISC DISEASE), LUMBAR: ICD-10-CM

## 2019-10-29 DIAGNOSIS — G89.29 CHRONIC BILATERAL LOW BACK PAIN WITHOUT SCIATICA: ICD-10-CM

## 2019-10-29 DIAGNOSIS — M54.50 CHRONIC BILATERAL LOW BACK PAIN WITHOUT SCIATICA: ICD-10-CM

## 2019-10-29 DIAGNOSIS — M50.30 DDD (DEGENERATIVE DISC DISEASE), CERVICAL: Primary | ICD-10-CM

## 2019-10-29 PROCEDURE — 97110 THERAPEUTIC EXERCISES: CPT | Mod: PO | Performed by: PHYSICAL THERAPIST
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
## 2019-11-01 ENCOUNTER — HOSPITAL ENCOUNTER (OUTPATIENT)
Dept: RADIOLOGY | Facility: HOSPITAL | Age: 19
Discharge: HOME | End: 2019-11-01
Attending: ORTHOPAEDIC SURGERY
Payer: MEDICAID

## 2019-11-01 ENCOUNTER — OFFICE VISIT (OUTPATIENT)
Dept: ORTHOPEDICS | Facility: CLINIC | Age: 19
End: 2019-11-01
Payer: MEDICAID

## 2019-11-01 ENCOUNTER — TELEPHONE (OUTPATIENT)
Dept: FAMILY MEDICINE | Facility: CLINIC | Age: 19
End: 2019-11-01

## 2019-11-01 ENCOUNTER — TELEPHONE (OUTPATIENT)
Dept: ORTHOPEDICS | Facility: CLINIC | Age: 19
End: 2019-11-01

## 2019-11-01 VITALS — HEIGHT: 68 IN | WEIGHT: 203.63 LBS | BODY MASS INDEX: 30.86 KG/M2

## 2019-11-01 DIAGNOSIS — M50.30 DDD (DEGENERATIVE DISC DISEASE), CERVICAL: ICD-10-CM

## 2019-11-01 DIAGNOSIS — M51.36 DDD (DEGENERATIVE DISC DISEASE), LUMBAR: ICD-10-CM

## 2019-11-01 DIAGNOSIS — M54.50 LOW BACK PAIN, UNSPECIFIED BACK PAIN LATERALITY, UNSPECIFIED CHRONICITY, UNSPECIFIED WHETHER SCIATICA PRESENT: Primary | ICD-10-CM

## 2019-11-01 DIAGNOSIS — M79.7 FIBROMYALGIA: Primary | ICD-10-CM

## 2019-11-01 PROCEDURE — 72050 X-RAY EXAM NECK SPINE 4/5VWS: CPT | Mod: TC

## 2019-11-01 PROCEDURE — 99213 PR OFFICE/OUTPT VISIT, EST, LEVL III, 20-29 MIN: ICD-10-PCS | Mod: S$PBB,,, | Performed by: ORTHOPAEDIC SURGERY

## 2019-11-01 PROCEDURE — 72050 XR CERVICAL SPINE AP LAT WITH FLEX EXTEN: ICD-10-PCS | Mod: 26,,, | Performed by: RADIOLOGY

## 2019-11-01 PROCEDURE — 99213 OFFICE O/P EST LOW 20 MIN: CPT | Mod: PBBFAC,25 | Performed by: ORTHOPAEDIC SURGERY

## 2019-11-01 PROCEDURE — 99999 PR PBB SHADOW E&M-EST. PATIENT-LVL III: ICD-10-PCS | Mod: PBBFAC,,, | Performed by: ORTHOPAEDIC SURGERY

## 2019-11-01 PROCEDURE — 72100 X-RAY EXAM L-S SPINE 2/3 VWS: CPT | Mod: 26,,, | Performed by: RADIOLOGY

## 2019-11-01 PROCEDURE — 99999 PR PBB SHADOW E&M-EST. PATIENT-LVL III: CPT | Mod: PBBFAC,,, | Performed by: ORTHOPAEDIC SURGERY

## 2019-11-01 PROCEDURE — 72100 XR LUMBAR SPINE AP AND LAT WITH FLEX/EXT: ICD-10-PCS | Mod: 26,,, | Performed by: RADIOLOGY

## 2019-11-01 PROCEDURE — 72120 XR LUMBAR SPINE AP AND LAT WITH FLEX/EXT: ICD-10-PCS | Mod: 26,,, | Performed by: RADIOLOGY

## 2019-11-01 PROCEDURE — 72120 X-RAY BEND ONLY L-S SPINE: CPT | Mod: 26,,, | Performed by: RADIOLOGY

## 2019-11-01 PROCEDURE — 72050 X-RAY EXAM NECK SPINE 4/5VWS: CPT | Mod: 26,,, | Performed by: RADIOLOGY

## 2019-11-01 PROCEDURE — 99213 OFFICE O/P EST LOW 20 MIN: CPT | Mod: S$PBB,,, | Performed by: ORTHOPAEDIC SURGERY

## 2019-11-01 PROCEDURE — 72100 X-RAY EXAM L-S SPINE 2/3 VWS: CPT | Mod: TC

## 2019-11-01 RX ORDER — PREGABALIN 75 MG/1
75 CAPSULE ORAL 2 TIMES DAILY
Qty: 60 CAPSULE | Refills: 1 | Status: SHIPPED | OUTPATIENT
Start: 2019-11-01 | End: 2019-12-04

## 2019-11-01 NOTE — TELEPHONE ENCOUNTER
----- Message from Ginny Atkins sent at 11/1/2019  3:38 PM CDT -----  Contact: [phone]/ Self   Patient requesting to speak with you regarding getting an appointment for her fibromyalgia. Please call.
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
## 2019-11-14 ENCOUNTER — TELEPHONE (OUTPATIENT)
Dept: ORTHOPEDICS | Facility: CLINIC | Age: 19
End: 2019-11-14

## 2019-11-14 ENCOUNTER — PATIENT MESSAGE (OUTPATIENT)
Dept: ORTHOPEDICS | Facility: CLINIC | Age: 19
End: 2019-11-14

## 2019-11-14 DIAGNOSIS — M79.7 FIBROMYALGIA: Primary | ICD-10-CM

## 2019-11-19 ENCOUNTER — PATIENT MESSAGE (OUTPATIENT)
Dept: ORTHOPEDICS | Facility: CLINIC | Age: 19
End: 2019-11-19

## 2019-11-29 ENCOUNTER — TELEPHONE (OUTPATIENT)
Dept: RHEUMATOLOGY | Facility: CLINIC | Age: 19
End: 2019-11-29

## 2019-11-29 ENCOUNTER — PATIENT MESSAGE (OUTPATIENT)
Dept: ORTHOPEDICS | Facility: CLINIC | Age: 19
End: 2019-11-29
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
## 2019-12-04 ENCOUNTER — PATIENT MESSAGE (OUTPATIENT)
Dept: ORTHOPEDICS | Facility: CLINIC | Age: 19
End: 2019-12-04

## 2019-12-04 RX ORDER — PREGABALIN 150 MG/1
150 CAPSULE ORAL 2 TIMES DAILY
Qty: 60 CAPSULE | Refills: 2 | Status: SHIPPED | OUTPATIENT
Start: 2019-12-04 | End: 2020-01-28 | Stop reason: SDUPTHER

## 2020-01-27 ENCOUNTER — PATIENT MESSAGE (OUTPATIENT)
Dept: ORTHOPEDICS | Facility: CLINIC | Age: 20
End: 2020-01-27

## 2020-01-27 DIAGNOSIS — Z98.890 S/P DISKECTOMY: ICD-10-CM

## 2020-01-27 DIAGNOSIS — M54.50 LOW BACK PAIN, UNSPECIFIED BACK PAIN LATERALITY, UNSPECIFIED CHRONICITY, UNSPECIFIED WHETHER SCIATICA PRESENT: Primary | ICD-10-CM

## 2020-01-27 DIAGNOSIS — M79.7 FIBROMYALGIA: ICD-10-CM

## 2020-01-28 ENCOUNTER — PATIENT MESSAGE (OUTPATIENT)
Dept: ORTHOPEDICS | Facility: CLINIC | Age: 20
End: 2020-01-28

## 2020-01-28 RX ORDER — PREGABALIN 150 MG/1
150 CAPSULE ORAL 2 TIMES DAILY
Qty: 60 CAPSULE | Refills: 2 | Status: SHIPPED | OUTPATIENT
Start: 2020-01-28 | End: 2020-06-12 | Stop reason: SDUPTHER

## 2020-02-03 NOTE — ADDENDUM NOTE
Addendum  created 10/05/18 1023 by Tonya Lopez CRNA    Intraprocedure Meds edited       Positive MARY with positive SSA with inflammatory polyarthritis involving small joints, bilateral knees and bilateral wrists with active synovitis.  Flare-up of lupus secondary to significant stress at work.  On 15 mg daily prednisone since last week for synovitis.  Restarted Plaquenil last week.  Check inflammatory markers and activity markers today.  Had difficulty with subcu Benlysta in the past.  Start IV Benlysta if needed based on activity markers.  Had difficulty taking oral methotrexate.  Check thiopurine methyltransferase enzyme activity to consider Imuran if needed  Also check Sjogren's markers.

## 2020-03-17 ENCOUNTER — HOSPITAL ENCOUNTER (EMERGENCY)
Facility: HOSPITAL | Age: 20
Discharge: HOME | End: 2020-03-17
Attending: EMERGENCY MEDICINE
Payer: MEDICAID

## 2020-03-17 VITALS
SYSTOLIC BLOOD PRESSURE: 135 MMHG | BODY MASS INDEX: 29.55 KG/M2 | DIASTOLIC BLOOD PRESSURE: 79 MMHG | HEIGHT: 68 IN | TEMPERATURE: 98 F | RESPIRATION RATE: 18 BRPM | HEART RATE: 83 BPM | OXYGEN SATURATION: 97 % | WEIGHT: 195 LBS

## 2020-03-17 DIAGNOSIS — M54.9 LEFT-SIDED BACK PAIN, UNSPECIFIED BACK LOCATION, UNSPECIFIED CHRONICITY: Primary | ICD-10-CM

## 2020-03-17 PROCEDURE — 99284 EMERGENCY DEPT VISIT MOD MDM: CPT | Mod: 25

## 2020-03-17 PROCEDURE — 63600175 PHARM REV CODE 636 W HCPCS: Performed by: EMERGENCY MEDICINE

## 2020-03-17 PROCEDURE — 96372 THER/PROPH/DIAG INJ SC/IM: CPT

## 2020-03-17 RX ORDER — KETOROLAC TROMETHAMINE 30 MG/ML
30 INJECTION, SOLUTION INTRAMUSCULAR; INTRAVENOUS
Status: COMPLETED | OUTPATIENT
Start: 2020-03-17 | End: 2020-03-17

## 2020-03-17 RX ORDER — DICLOFENAC SODIUM 50 MG/1
50 TABLET, DELAYED RELEASE ORAL 3 TIMES DAILY
Qty: 15 TABLET | Refills: 0 | Status: SHIPPED | OUTPATIENT
Start: 2020-03-17 | End: 2021-03-17

## 2020-03-17 RX ADMIN — KETOROLAC TROMETHAMINE 30 MG: 30 INJECTION, SOLUTION INTRAMUSCULAR at 02:03
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
## 2020-04-02 ENCOUNTER — DOCUMENTATION ONLY (OUTPATIENT)
Dept: REHABILITATION | Facility: HOSPITAL | Age: 20
End: 2020-04-02

## 2020-04-02 PROBLEM — G89.29 CHRONIC BILATERAL LOW BACK PAIN: Status: RESOLVED | Noted: 2019-10-21 | Resolved: 2020-04-02

## 2020-04-02 PROBLEM — M54.50 CHRONIC BILATERAL LOW BACK PAIN: Status: RESOLVED | Noted: 2019-10-21 | Resolved: 2020-04-02

## 2020-04-02 NOTE — PROGRESS NOTES
Patient was seen for 2  outpatient PT visits from 10/21/19 to 10/29/19. Treatment included: evaluation, HEP,and ther ex. . Pt has met no  goals. She did not return for further treatment. This patient is discharged from outpatient PT Services.    Jesse Malloy, PT

## 2020-04-07 ENCOUNTER — TELEPHONE (OUTPATIENT)
Dept: NEUROSURGERY | Facility: CLINIC | Age: 20
End: 2020-04-07

## 2020-04-07 NOTE — TELEPHONE ENCOUNTER
----- Message from Lisa Alvarado sent at 4/7/2020 11:34 AM CDT -----  Contact: Paul keating   Type: Sooner appointment request    Caller is requesting a sooner appointment.    Who Called: Paul  When is the first available appointment:  None listed  Symptoms:back pains, ortho doctor suggested seeing a neuro doctor.  Best call back number: [phone]  Additional Information: Requesting a call back.       Please advise. Thank you
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
## 2020-06-29 ENCOUNTER — CLINICAL SUPPORT (OUTPATIENT)
Dept: URGENT CARE | Facility: CLINIC | Age: 20
End: 2020-06-29
Payer: MEDICAID

## 2020-06-29 ENCOUNTER — TELEPHONE (OUTPATIENT)
Dept: FAMILY MEDICINE | Facility: CLINIC | Age: 20
End: 2020-06-29

## 2020-06-29 ENCOUNTER — OFFICE VISIT (OUTPATIENT)
Dept: URGENT CARE | Facility: CLINIC | Age: 20
End: 2020-06-29
Payer: MEDICAID

## 2020-06-29 DIAGNOSIS — Z03.818 ENCOUNTER FOR OBSERVATION FOR SUSPECTED EXPOSURE TO OTHER BIOLOGICAL AGENTS RULED OUT: ICD-10-CM

## 2020-06-29 PROCEDURE — 99211 OFF/OP EST MAY X REQ PHY/QHP: CPT | Mod: S$GLB,,, | Performed by: PHYSICIAN ASSISTANT

## 2020-06-29 PROCEDURE — U0003 INFECTIOUS AGENT DETECTION BY NUCLEIC ACID (DNA OR RNA); SEVERE ACUTE RESPIRATORY SYNDROME CORONAVIRUS 2 (SARS-COV-2) (CORONAVIRUS DISEASE [COVID-19]), AMPLIFIED PROBE TECHNIQUE, MAKING USE OF HIGH THROUGHPUT TECHNOLOGIES AS DESCRIBED BY CMS-2020-01-R: HCPCS

## 2020-06-29 PROCEDURE — 99211 PR OFFICE/OUTPT VISIT, EST, LEVL I: ICD-10-PCS | Mod: S$GLB,,, | Performed by: PHYSICIAN ASSISTANT

## 2020-06-29 NOTE — TELEPHONE ENCOUNTER
CALLED RN SUP FOR TELE BED New medicaid never seen in Family Medicine.     Can the patient establish care

## 2020-06-29 NOTE — TELEPHONE ENCOUNTER
Spoke with Angeliajesus Weston, patient's grandmother. She wanted to know if Dr Melendez would see Guillermo as a patient. Please advise. Patient would be a new medicaid patient.

## 2020-06-29 NOTE — PROGRESS NOTES

## 2020-06-29 NOTE — TELEPHONE ENCOUNTER
----- Message from Jojo Huang sent at 6/29/2020  8:27 AM CDT -----  Type: Needs Medical Advice  Who Called:  Patient   Best Call Back Number   Additional Information: Per patient would like to establish care

## 2020-07-02 LAB — SARS-COV-2 RNA RESP QL NAA+PROBE: NOT DETECTED

## 2020-07-15 NOTE — TELEPHONE ENCOUNTER
Call placed to patient, M to call back to schedule with [employer] to establish care with Dr Melendez.   Cleared per Dr Melendez and Keiko Diggs, Medicaid patient new to this department.

## 2021-02-11 ENCOUNTER — PATIENT MESSAGE (OUTPATIENT)
Dept: ORTHOPEDICS | Facility: CLINIC | Age: 21
End: 2021-02-11

## 2021-03-26 ENCOUNTER — PATIENT MESSAGE (OUTPATIENT)
Dept: ORTHOPEDICS | Facility: CLINIC | Age: 21
End: 2021-03-26

## 2021-03-29 ENCOUNTER — PATIENT MESSAGE (OUTPATIENT)
Dept: ORTHOPEDICS | Facility: CLINIC | Age: 21
End: 2021-03-29

## 2021-04-29 ENCOUNTER — PATIENT MESSAGE (OUTPATIENT)
Dept: RESEARCH | Facility: HOSPITAL | Age: 21
End: 2021-04-29

## 2021-06-23 NOTE — PLAN OF CARE
"Occupational Therapy Daily Note    Patient:  Malachi Weston  Date of Therapy Visit:  2017  Referring Physician:  Dr Rodrigez  Diagnosis: Right TFCC tear and ulnar styloid avulsion  MRN : [id number]  Referral Orders:  Eval and treat      Start Time: 1030am  End Time:  1140am  Total Time:  70min    Visit # 4     HISTORY/OCCUPATIONAL PROFILE/ Medical and Therapy History:  Subjective:    Patient is a 17 year old right hand dominant female who presents for occupational therapy s/p Right partial thickness TFCC tear and ulnar styloid avulsion fracture. She states that on , she was lifting her back pack and heard a pop.  After this she had constant ulnar-sided wrist pain.  She saw her MD where she was issued a wrist splint for 5 months and given OTC anti-inflammatories.  MRI on 17 revealed ulnar styoid fracture and TFCC tear.  She states Dr Rodrigez tried a cortisone injection for her pain.  On 2017 (5 days ago) he performed a TFCC debridement.  Patient's chief complaint is pain  and reports difficulty with mobility.    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 Source of information:   Epic records  Conversation with patient, without prompting     Allergies  Allergy list not thoroughly reviewed with patient at this time  Allergies listed in Epic as follows: Patient has no known allergies.      Notes regarding home medications:   Patient did not receive any of their home medications prior to arrival to the emergency department  Patient stated that lisinopril dose was reduced from 20 to 10 mg daily  Current Lantus dose is 20 units nightly  Was told to stop taking vitamin B12  Removed ferrous sulfate and ammonium lactate as patient does not take/use either      LIBAN Foster [facility], PharmD   6/23/2021 11:29 AM "3mhz x 8" @.9w/cm2 to radial border or wrist  THERAPEUTIC ACTIVITIES x 60  mins: to increase ROM, increase strength and increase functional use   -TGExs 3x10  -wrist e/f, rd/ud,s/p 3 x 10  -dextercisor x 4  -wrist powerflex x 4  -functional dexterity board with nesting and translation x 2 rounds  -orange putty rolling with wrist extension  -yellow digiflex x 3  -red weighted ball CW & CCW x 4 (2 each)  -yellow clothespin over wheel x 3  -s/p wheel x 4  -red tbar for rd/ud on table top x 4      Assessment:     Medical necessity is demonstrated by the following IMPAIRMENTS/PROBLEMS:  Patient Lack of Knowledge/Awareness in Home Program  Increased Pain in right  Decreased functional use/ unable to perform ADLs/iADLs  Increased Edema  Decreased ROM   Decreased  strength  Decreased pinch strength  Scar Adherent    Pt presents to occupational therapy with an OT diagnosis of Right partial thickness TFCC tear and ulnar styloid avulsion fracture and presents with the above listed problem areas.  She states she went swimming yesterday and had increased pain and swelling for an hour afterwards.  She arrives with no change in edema today.  ROM has increased significantly (see objective measurements).  She is recently complaining of pain at the radial border and thumb cmc area of her wrist now.  Today, she presents with a positive Finkelstein's.  We performed an US to this area .  She states she officially starts college tomorrow and has been doing a lot of moving.  She states she wears the brace most of the time.  She tolerated all exercises in therapy today with no significant increase in pain.  The patient requires skilled occupational therapy to address the problems identified above and to achieve the individual patient goals as outlined in the problems and goals section.    GOALS:  Short Therm Goals: (2 weeks)    STG: Patient will be independent in home exercise and self care program    STG : Symptomatic Improvements: " Decreasing Pain: to 5/10 for increased activity tolerance    STG: Patient to be IND with HEP and modalities for pain management   Long Term Goals: (8-10 weeks)   LTG: Decrease edema in right wrist to WNLs for increased ability to hold a glass of water   LTG: Decrease complaints of pain to 2 out of 10 to increase tolerance for ADLs    LTG: Increase independence in the following ADLs/iADLs: use utensils to feed self    LTG: Increase ROM to right = left in order to turn a doorknob   LTG: Increase functional use of right to carry a backpack   LTG: Increase  strength of right to  handlebars of bike     Pt's spiritual, cultural and educational needs considered and patient is agreeable to plan of care and goals as stated above.     There are no Anticipated Barriers for Occupational  therapy      Plan:   Patient to be treated by Occupational Therapy 2 times per week for 6 weeks  to achieve the established goals. Treatment to include modalities including but not limited to paraffin, fluidotherapy, moist heat pack, edema control techniques, A/PROM, Manual therapy/mobilizations, Therapeutic exercises/activities, ultrasound, scar maturation techniques, desensitization, strengthening, neural mobilizations/nerve gliding, stretching as well as any other treatments deemed necessary based on the patient's needs or progress.                     I certify the need for these services furnished under this plan of treatment and while under my care    ____________________________________                        ______________  Physician/Referring Practitioner                   Date of Signature

## 2022-02-01 NOTE — PROGRESS NOTES
Date: 12/21/2018    Supervising Physician: Anjum Perez M.D.    Date of Surgery: 10/4/2018    Procedure: Left L5/S1 diskectomy    History: Malachi Weston is seen today for follow-up following the above listed procedure. Overall the patient is doing well but today notes her pain is a 0/10 today.  She says the pain is improved compared to before surgery, but she still has spasms in her calves, primarily on the right.  She says these occur right before bed and first thing in the morning.  They do not occur during the day.  She takes ibuprofen occasionally for pain.   she denies fever, chills, and sweats since the time of the surgery.       Exam:  Incision is healed.   There is no sign of infection. Neuro exam is stable. No signs of DVT.    Radiographs: no new imaging today.    Assessment/Plan: 8 weeks post op.    Doing well postoperatively.   I have given her a prescription for flexeril for the muscle spasms.  We will also try mobic during the day.     I will plan to see the patient back for the next postop visit in 8 weeks.     Thank you for the opportunity to participate in this patient's care. Please give me a call if there are any concerns or questions.       Tacrolimus level 7.2 at 12.5 hours, on 1/31/2022.  Goal 7-9.   Current dose 4.5 mg in AM, 5 mg in PM    Level at goal, no change in dose.   [facility] message sent

## (undated) DEVICE — MARKER SKIN STND TIP BLUE BARR

## (undated) DEVICE — DIFFUSER

## (undated) DEVICE — SPONGE LAP 4X18 PREWASHED

## (undated) DEVICE — CLOSURE SKIN 1X5 STERI-STRIP

## (undated) DEVICE — DRAPE STERI INSTRUMENT 1018

## (undated) DEVICE — SYS CLSR DERMABOND PRINEO 22CM

## (undated) DEVICE — DRAPE STERI-DRAPE 1000 17X11IN

## (undated) DEVICE — DRAPE ABDOMINAL TIBURON 14X11

## (undated) DEVICE — DRESSING AQUACEL FOAM 3 X 3

## (undated) DEVICE — DRAPE C ARM 42 X 120 10/BX

## (undated) DEVICE — BUR BONE CUT MICRO TPS 3X3.8MM

## (undated) DEVICE — SUT STRATAFIX 3-0 30CM

## (undated) DEVICE — NDL SPINAL 18GX3.5 SPINOCAN

## (undated) DEVICE — ELECTRODE REM PLYHSV RETURN 9

## (undated) DEVICE — SEE MEDLINE ITEM 156905

## (undated) DEVICE — SEE MEDLINE ITEM 157117

## (undated) DEVICE — KIT SURGIFLO EVITHROM

## (undated) DEVICE — STRIP STERI REIN CLSR 1/2X2IN

## (undated) DEVICE — DRESSING SURGICAL 1/2X1/2

## (undated) DEVICE — SUT VICRYL PLUS 2-0 CT1 18

## (undated) DEVICE — CORD BIPOLAR 12 FOOT

## (undated) DEVICE — Device

## (undated) DEVICE — SEE MEDLINE ITEM 157131

## (undated) DEVICE — ELECTRODE BLADE INSULATED 1 IN

## (undated) DEVICE — DRESSING TEGADERM 4.4X5IN

## (undated) DEVICE — SEE MEDLINE ITEM 146347

## (undated) DEVICE — APPLICATOR CHLORAPREP ORN 26ML

## (undated) DEVICE — SEE MEDLINE ITEM 157150

## (undated) DEVICE — CARTRIDGE OIL

## (undated) DEVICE — SUT VICRYL+ 1 CT1 18IN

## (undated) DEVICE — DRESSING AQUACEL FOAM 5 X 5

## (undated) DEVICE — SEE MEDLINE ITEM 146417

## (undated) DEVICE — DRESSING TRANS 4X4 TEGADERM

## (undated) DEVICE — SUTURE STRATAFIX PGA PCL 3-0

## (undated) DEVICE — DRESSING ABSRBNT ISLAND 3.6X8